# Patient Record
Sex: FEMALE | Race: WHITE | NOT HISPANIC OR LATINO | Employment: UNEMPLOYED | RURAL
[De-identification: names, ages, dates, MRNs, and addresses within clinical notes are randomized per-mention and may not be internally consistent; named-entity substitution may affect disease eponyms.]

---

## 2022-01-31 ENCOUNTER — TRANSCRIBE ORDERS (OUTPATIENT)
Dept: PHYSICAL THERAPY | Facility: CLINIC | Age: 67
End: 2022-01-31

## 2022-01-31 ENCOUNTER — TREATMENT (OUTPATIENT)
Dept: PHYSICAL THERAPY | Facility: CLINIC | Age: 67
End: 2022-01-31

## 2022-01-31 DIAGNOSIS — M48.061 LUMBAR FORAMINAL STENOSIS: Primary | ICD-10-CM

## 2022-01-31 DIAGNOSIS — M54.10 RADICULAR PAIN OF LEFT LOWER EXTREMITY: ICD-10-CM

## 2022-01-31 PROCEDURE — 97110 THERAPEUTIC EXERCISES: CPT | Performed by: PHYSICAL THERAPIST

## 2022-01-31 PROCEDURE — 97161 PT EVAL LOW COMPLEX 20 MIN: CPT | Performed by: PHYSICAL THERAPIST

## 2022-01-31 NOTE — PROGRESS NOTES
Physical Therapy Initial Evaluation and Plan of Care    Patient: Adelina Xavier   : 1955  Diagnosis/ICD-10 Code:  Lumbar foraminal stenosis [M48.061]  Referring practitioner: Andrea Allen MD  Date of Initial Visit: 2022  Today's Date: 2022  Patient seen for 1 sessions             Subjective Evaluation    History of Present Illness  Mechanism of injury: Patient is a 66 y.o. WF who presents with LBP with radiculopathy L LE for ~4 years after living in [facility] and driving on very bumpy roads for 2 years.  She has been in pain management for the past year and has tried 2 rounds of epidurals, branch block injection and radio frequency ablasion.  She has tried her friends inversion table.  Goals are to decrease pain.  She goes up/down stairs frequently throughout the day.  She goes back to Dr. Allen in February.     Patient Goals  Patient goals for therapy: decreased pain, increased strength and return to sport/leisure activities             Objective WOMAC functional score indicates 41% impairment with limitations in standing, walking, cooking, cleaning.  Lumbar AROM is WFL, pain on extension.  Deconditioned abdominals with protruding abdomen, otherwise in good physical condition.  MMT:  4/5 B hip flexors and L knee flexion, otherwise 4+-5/5.  Repeated sit to stand = 9 reps with arms on chair in 30 sec.  Pelvis level.  No remarkable tenderness to palpation.      Assessment & Plan     Assessment  Impairments: activity intolerance, impaired physical strength and lacks appropriate home exercise program  Functional Limitations: walking, uncomfortable because of pain and standing  Goals  Plan Goals: Patient independent with HEP in 2 weeks  Tolerate 10 min Nustep in 2 weeks  Decrease radicular symptoms 25% in 2 weeks  Improve function as evidenced by WOMAC score of 20% or less by discharge (41% impairment initially)  Able to tolerate standing to cook in 12 weeks  Decrease radicular symptoms 50%  in 12 weeks  Perform 10 reps of repeated sit to stand without arms in 30 seconds in 12 weeks       Plan  Therapy options: will be seen for skilled therapy services  Planned modality interventions: traction, thermotherapy (hydrocollator packs) and electrical stimulation/Russian stimulation  Other planned modality interventions: physical modalities as needed  Planned therapy interventions: balance/weight-bearing training, flexibility, functional ROM exercises, gait training, home exercise program, neuromuscular re-education, postural training, strengthening, stretching and therapeutic activities  Frequency: 2x week  Duration in weeks: 12  Treatment plan discussed with: patient        Timed:         Manual Therapy:         mins  98257;     Therapeutic Exercise:    15     mins  73470;     Neuromuscular Taiwo:        mins  25364;    Therapeutic Activity:          mins  17213;     Gait Training:           mins  14526;     Ultrasound:          mins  00067;    Self-care  ____ mins 20655    Un-Timed:  Electrical Stimulation:         mins  56242 ( );  Dry Needling          mins self-pay 23116  Traction          mins 89196  Low Eval     30     Mins  46581  Mod Eval          Mins  80663  Canal repositioning _____ mins  49955        Timed Treatment:   15   mins   Total Treatment:     45   mins    PT SIGNATURE: Robin A Sprigler, PT   [place] license # [id number]  Electronically signed by Robin A Sprigler, PT, 01/31/22, 11:55 AM EST    Initial Certification  Certification Period: 1/31/2022 through 4/30/2022  I certify that the therapy services are furnished while this patient is under my care.  The services outlined above are required by this patient, and will be reviewed every 90 days.     PHYSICIAN: Andrea Allen MD ______________________________________________________________________________________________     DATE:  _________________________________________________________________________________________________________________________________    Please sign and return via fax to [phone]. Thank you, [facility].

## 2022-02-10 ENCOUNTER — TREATMENT (OUTPATIENT)
Dept: PHYSICAL THERAPY | Facility: CLINIC | Age: 67
End: 2022-02-10

## 2022-02-10 DIAGNOSIS — M48.061 LUMBAR FORAMINAL STENOSIS: Primary | ICD-10-CM

## 2022-02-10 DIAGNOSIS — M54.10 RADICULAR PAIN OF LEFT LOWER EXTREMITY: ICD-10-CM

## 2022-02-10 PROCEDURE — 97112 NEUROMUSCULAR REEDUCATION: CPT | Performed by: PHYSICAL THERAPIST

## 2022-02-10 PROCEDURE — 97012 MECHANICAL TRACTION THERAPY: CPT | Performed by: PHYSICAL THERAPIST

## 2022-02-10 PROCEDURE — 97110 THERAPEUTIC EXERCISES: CPT | Performed by: PHYSICAL THERAPIST

## 2022-02-10 NOTE — PROGRESS NOTES
Physical Therapy Daily Progress Note    Patient: Adelina Xavier   : 1955  Diagnosis/ICD-10 Code:  Lumbar foraminal stenosis [M48.061]  Referring practitioner: Andrea Allen MD  Date of Initial Visit: Type: THERAPY  Noted: 2022  Today's Date: 2/10/2022  Patient seen for 2 sessions             Subjective Patient reports no decrease in leg symptoms since evaluation.  She is unsure when she goes back to Andrea Allen.    Objective   See Exercise, Manual, and Modality Logs for complete treatment. Added Nustep and lumbar traction with .  Assess response to treatment next visit.  VCs needed for repeated sit to stand (feet wide, sit on edge of chair, keep head and chest up)  and lumbar flexion (relax head and back).      Assessment/Plan  Patient independent with HEP in 2 weeks - NOT MET  Tolerate 10 min Nustep in 2 weeks - NOT MET  Decrease radicular symptoms 25% in 2 weeks - NOT MET  Improve function as evidenced by WOMAC score of 20% or less by discharge (41% impairment initially) - NOT MET  Able to tolerate standing to cook in 12 weeks - NOT MET  Decrease radicular symptoms 50% in 12 weeks - NOT MET  Perform 10 reps of repeated sit to stand without arms in 30 seconds in 12 weeks   - NOT MET    Progress per Plan of Care expiring 22           Timed:         Manual Therapy:         mins  85323;     Therapeutic Exercise:    15     mins  91010;     Neuromuscular Taiwo:  15      mins  24281;    Therapeutic Activity:          mins  80207;     Gait Training:           mins  24585;     Ultrasound:          mins  91802;    Ionto                                   mins   58865  Self Care                            mins   61473      Un-Timed:  Electrical Stimulation:         mins  26874 ( );  Dry Needling          mins self-pay  Traction     15     mins 25259  Low Eval          Mins  17020  Mod Eval          Mins  67222  High Eval                            Mins  52911  [facility]                    mins  55468    Timed Treatment:   30   mins   Total Treatment:     45   mins    Robin A Sprigler, PT  Physical Therapist

## 2022-02-24 ENCOUNTER — TELEPHONE (OUTPATIENT)
Dept: PHYSICAL THERAPY | Facility: CLINIC | Age: 67
End: 2022-02-24

## 2022-03-03 ENCOUNTER — TREATMENT (OUTPATIENT)
Dept: PHYSICAL THERAPY | Facility: CLINIC | Age: 67
End: 2022-03-03

## 2022-03-03 DIAGNOSIS — M48.061 LUMBAR FORAMINAL STENOSIS: Primary | ICD-10-CM

## 2022-03-03 DIAGNOSIS — M54.10 RADICULAR PAIN OF LEFT LOWER EXTREMITY: ICD-10-CM

## 2022-03-03 PROCEDURE — 97110 THERAPEUTIC EXERCISES: CPT | Performed by: PHYSICAL THERAPIST

## 2022-03-03 PROCEDURE — 97112 NEUROMUSCULAR REEDUCATION: CPT | Performed by: PHYSICAL THERAPIST

## 2022-03-03 PROCEDURE — 97012 MECHANICAL TRACTION THERAPY: CPT | Performed by: PHYSICAL THERAPIST

## 2022-03-03 NOTE — PROGRESS NOTES
Physical Therapy Daily Progress Note    Patient: Adelina Xavier   : 1955  Diagnosis/ICD-10 Code:  Lumbar foraminal stenosis [M48.061]  Referring practitioner: Andrea Allen MD  Date of Initial Visit: Type: THERAPY  Noted: 2022  Today's Date: 3/3/2022  Patient seen for 3 sessions             Subjective Patient reports she bought an inversion table for home.  Patient reports no pain after last treatment while walking in town.  Patient missed last week due to weather and the week before her  had other plans he hadn't told her about.      Objective   See Exercise, Manual, and Modality Logs for complete treatment.  Discussed use of inversion table.  Added standing hip abd and upper row/lat pull today with good response.  Held sit to stand due to knee pain today.      Assessment/Plan  Patient independent with HEP in 2 weeks - NOT MET  Tolerate 10 min Nustep in 2 weeks - NOT MET  Decrease radicular symptoms 25% in 2 weeks - NOT MET  Improve function as evidenced by WOMAC score of 20% or less by discharge (41% impairment initially) - NOT MET  Able to tolerate standing to cook in 12 weeks - NOT MET  Decrease radicular symptoms 50% in 12 weeks - NOT MET  Perform 10 reps of repeated sit to stand without arms in 30 seconds in 12 weeks   - NOT MET    Progress per Plan of Care expiring 22           Timed:         Manual Therapy:         mins  03347;     Therapeutic Exercise:    15     mins  16694;     Neuromuscular Taiwo:    15    mins  81444;    Therapeutic Activity:          mins  45227;     Gait Training:           mins  04608;     Ultrasound:          mins  92674;    Ionto                                   mins   27726  Self Care                            mins   97621      Un-Timed:  Electrical Stimulation:         mins  36014 ( );  Dry Needling          mins self-pay  Traction     15     mins 02693  Low Eval          Mins  73254  Mod Eval          Mins  33043  High Eval                             Mins  78052  [facility]                   mins  43805    Timed Treatment:   30   mins   Total Treatment:     45   mins    Robin A Sprigler, PT  Physical Therapist

## 2022-03-10 ENCOUNTER — TREATMENT (OUTPATIENT)
Dept: PHYSICAL THERAPY | Facility: CLINIC | Age: 67
End: 2022-03-10

## 2022-03-10 DIAGNOSIS — M54.10 RADICULAR PAIN OF LEFT LOWER EXTREMITY: ICD-10-CM

## 2022-03-10 DIAGNOSIS — M48.061 LUMBAR FORAMINAL STENOSIS: Primary | ICD-10-CM

## 2022-03-10 PROCEDURE — 97112 NEUROMUSCULAR REEDUCATION: CPT | Performed by: PHYSICAL THERAPIST

## 2022-03-10 PROCEDURE — 97012 MECHANICAL TRACTION THERAPY: CPT | Performed by: PHYSICAL THERAPIST

## 2022-03-10 PROCEDURE — 97110 THERAPEUTIC EXERCISES: CPT | Performed by: PHYSICAL THERAPIST

## 2022-03-10 NOTE — PROGRESS NOTES
Physical Therapy Daily Progress Note    Patient: Adelina Xavier   : 1955  Diagnosis/ICD-10 Code:  Lumbar foraminal stenosis [M48.061]  Referring practitioner: Andrea Allen MD  Date of Initial Visit: Type: THERAPY  Noted: 2022  Today's Date: 3/10/2022  Patient seen for 4 sessions             Subjective Patient is feeling better!  She is consistently using her inversion table 3 x's per day 5-8 min each.    Objective   See Exercise, Manual, and Modality Logs for complete treatment. Repeated 60# with traction today with good tolerance.  Symptoms improving.      Assessment/Plan  Patient independent with HEP in 2 weeks - MET  Tolerate 10 min Nustep in 2 weeks -  MET  Decrease radicular symptoms 25% in 2 weeks - MET  Improve function as evidenced by WOMAC score of 20% or less in 12 weeks (41% impairment initially) - NOT MET  Able to tolerate standing to cook in 12 weeks - NOT MET  Decrease radicular symptoms 50% in 12 weeks - NOT MET  Perform 10 reps of repeated sit to stand without arms in 30 seconds in 12 weeks   - NOT MET    Progress per Plan of Care expiring 22           Timed:         Manual Therapy:         mins  14269;     Therapeutic Exercise:    15     mins  86988;     Neuromuscular Taiwo:    15    mins  36465;    Therapeutic Activity:          mins  81318;     Gait Training:           mins  13808;     Ultrasound:          mins  60654;    Ionto                                   mins   64510  Self Care                            mins   57187      Un-Timed:  Electrical Stimulation:         mins  46356 ( );  Dry Needling          mins self-pay  Traction     15     mins 12467  Low Eval          Mins  17593  Mod Eval          Mins  38082  High Eval                            Mins  25861  Canalith Repos                   mins  44565    Timed Treatment:   30   mins   Total Treatment:     45   mins    Robin A Sprigler, PT  Physical Therapist

## 2022-03-17 ENCOUNTER — TREATMENT (OUTPATIENT)
Dept: PHYSICAL THERAPY | Facility: CLINIC | Age: 67
End: 2022-03-17

## 2022-03-17 DIAGNOSIS — M54.10 RADICULAR PAIN OF LEFT LOWER EXTREMITY: ICD-10-CM

## 2022-03-17 DIAGNOSIS — M48.061 LUMBAR FORAMINAL STENOSIS: Primary | ICD-10-CM

## 2022-03-17 PROCEDURE — 97110 THERAPEUTIC EXERCISES: CPT | Performed by: PHYSICAL THERAPIST

## 2022-03-17 PROCEDURE — 97112 NEUROMUSCULAR REEDUCATION: CPT | Performed by: PHYSICAL THERAPIST

## 2022-03-17 PROCEDURE — 97012 MECHANICAL TRACTION THERAPY: CPT | Performed by: PHYSICAL THERAPIST

## 2022-03-17 NOTE — PROGRESS NOTES
Physical Therapy Daily Progress Note    Patient: Adelina Xavier   : 1955  Diagnosis/ICD-10 Code:  Lumbar foraminal stenosis [M48.061]  Referring practitioner: Andrea Allen MD  Date of Initial Visit: Type: THERAPY  Noted: 2022  Today's Date: 3/17/2022  Patient seen for 5 sessions             Subjective Patient continues to do well, consistently performing HEP and using inversion table 8 min twice per day.  Radicular pain has decreased about 25% since starting PT.  She reports some back pain with lat pull at home, discussed modifications and will reassess next visit.  Still pain with cooking/standing, using a rolling stool occasionally for relief.    Objective   See Exercise, Manual, and Modality Logs for complete treatment. Reviewed and discussed HEP answering questions from patient.      Assessment/Plan  Patient independent with HEP in 2 weeks - MET  Tolerate 10 min Nustep in 2 weeks -  MET  Decrease radicular symptoms 25% in 2 weeks - MET  Improve function as evidenced by WOMAC score of 20% or less in 12 weeks (41% impairment initially) - NOT MET  Able to tolerate standing to cook in 12 weeks - NOT MET  Decrease radicular symptoms 50% in 12 weeks - NOT MET  Perform 10 reps of repeated sit to stand without arms in 30 seconds in 12 weeks   - MET    Progress per Plan of Care expiring 22           Timed:         Manual Therapy:         mins  49552;     Therapeutic Exercise:    15     mins  29646;     Neuromuscular Taiwo:    15    mins  09457;    Therapeutic Activity:          mins  71635;     Gait Training:           mins  96118;     Ultrasound:          mins  99480;    Ionto                                   mins   45273  Self Care                            mins   98031      Un-Timed:  Electrical Stimulation:         mins  31941 ( );  Dry Needling          mins self-pay  Traction     15     mins 39030  Low Eval          Mins  14612  Mod Eval          Mins  85782  High Eval                             Mins  35529  [facility]                   mins  24004    Timed Treatment:   30   mins   Total Treatment:     45   mins    Robin A Sprigler, PT  Physical Therapist

## 2022-03-23 ENCOUNTER — TREATMENT (OUTPATIENT)
Dept: PHYSICAL THERAPY | Facility: CLINIC | Age: 67
End: 2022-03-23

## 2022-03-23 DIAGNOSIS — M48.061 LUMBAR FORAMINAL STENOSIS: Primary | ICD-10-CM

## 2022-03-23 DIAGNOSIS — M54.10 RADICULAR PAIN OF LEFT LOWER EXTREMITY: ICD-10-CM

## 2022-03-23 PROCEDURE — 97112 NEUROMUSCULAR REEDUCATION: CPT | Performed by: PHYSICAL THERAPIST

## 2022-03-23 PROCEDURE — 97012 MECHANICAL TRACTION THERAPY: CPT | Performed by: PHYSICAL THERAPIST

## 2022-03-23 PROCEDURE — 97110 THERAPEUTIC EXERCISES: CPT | Performed by: PHYSICAL THERAPIST

## 2022-03-23 NOTE — PROGRESS NOTES
Physical Therapy Daily Progress Note    Patient: Adelina Xavier   : 1955  Diagnosis/ICD-10 Code:  Lumbar foraminal stenosis [M48.061]  Referring practitioner: Andrea Allen MD  Date of Initial Visit: Type: THERAPY  Noted: 2022  Today's Date: 3/23/2022  Patient seen for 6 sessions             Subjective Patient reports increased pain Monday after a busy weekend.  She reports her muscles were spasming and now it is very tender to touch on sacrum and spine.  She has continued to use her inversion table which helps but held off on exercises until today. She saw her spine doctor on Monday but nothing new.    Objective   See Exercise, Manual, and Modality Logs for complete treatment. Able to tolerate therex in clinic without difficulty      Assessment/Plan  Patient independent with HEP in 2 weeks - MET  Tolerate 10 min Nustep in 2 weeks -  MET  Decrease radicular symptoms 25% in 2 weeks - MET  Improve function as evidenced by WOMAC score of 20% or less in 12 weeks (41% impairment initially) - NOT MET  Able to tolerate standing to cook in 12 weeks - NOT MET  Decrease radicular symptoms 50% in 12 weeks - NOT MET  Perform 10 reps of repeated sit to stand without arms in 30 seconds in 12 weeks   - MET    Progress per Plan of Care expiring 22           Timed:         Manual Therapy:         mins  03052;     Therapeutic Exercise:    15     mins  11338;     Neuromuscular Taiwo:    15    mins  85441;    Therapeutic Activity:          mins  11902;     Gait Training:           mins  25342;     Ultrasound:          mins  24842;    Ionto                                   mins   90716  Self Care                            mins   77764      Un-Timed:  Electrical Stimulation:         mins  93090 ( );  Dry Needling          mins self-pay  Traction     15     mins 65748  Low Eval          Mins  86333  Mod Eval          Mins  66311  High Eval                            Mins  04787  [facility]                    mins  55477    Timed Treatment:   30   mins   Total Treatment:     45   mins    Robin A Sprigler, PT  Physical Therapist

## 2022-04-07 ENCOUNTER — TREATMENT (OUTPATIENT)
Dept: PHYSICAL THERAPY | Facility: CLINIC | Age: 67
End: 2022-04-07

## 2022-04-07 DIAGNOSIS — M54.10 RADICULAR PAIN OF LEFT LOWER EXTREMITY: ICD-10-CM

## 2022-04-07 DIAGNOSIS — M48.061 LUMBAR FORAMINAL STENOSIS: Primary | ICD-10-CM

## 2022-04-07 PROCEDURE — 97110 THERAPEUTIC EXERCISES: CPT | Performed by: PHYSICAL THERAPIST

## 2022-04-07 PROCEDURE — 97012 MECHANICAL TRACTION THERAPY: CPT | Performed by: PHYSICAL THERAPIST

## 2022-04-07 PROCEDURE — 97112 NEUROMUSCULAR REEDUCATION: CPT | Performed by: PHYSICAL THERAPIST

## 2022-04-07 NOTE — PROGRESS NOTES
Physical Therapy Daily Progress Note    Patient: Adelina Xavier   : 1955  Diagnosis/ICD-10 Code:  Lumbar foraminal stenosis [M48.061]  Referring practitioner: Andrea Allen MD  Date of Initial Visit: Type: THERAPY  Noted: 2022  Today's Date: 2022  Patient seen for 7 sessions             Subjective Patient reports increased radicular symptoms after trip to [place] and sitting in [facility].  Unable to use inversion table while she was gone.  Didn't know whether to do exercises when in that much pain.    Objective   See Exercise, Manual, and Modality Logs for complete treatment. Not seen since 3/23/22.  Radicular pain increased today to ankle.  Patient reports feeling better after treatment.      Assessment/Plan  Patient independent with HEP in 2 weeks - MET  Tolerate 10 min Nustep in 2 weeks -  MET  Decrease radicular symptoms 25% in 2 weeks - MET  Improve function as evidenced by WOMAC score of 20% or less in 12 weeks (41% impairment initially) - NOT MET  Able to tolerate standing to cook in 12 weeks - NOT MET  Decrease radicular symptoms 50% in 12 weeks - NOT MET  Perform 10 reps of repeated sit to stand without arms in 30 seconds in 12 weeks   - MET    Progress per Plan of Care expiring 22           Timed:         Manual Therapy:         mins  52153;     Therapeutic Exercise:    15     mins  58377;     Neuromuscular Taiwo:    15    mins  93132;    Therapeutic Activity:          mins  86421;     Gait Training:           mins  89560;     Ultrasound:          mins  25048;    Ionto                                   mins   07574  Self Care                            mins   68201      Un-Timed:  Electrical Stimulation:         mins  34338 ( );  Dry Needling          mins self-pay  Traction     15     mins 39029  Low Eval          Mins  24962  Mod Eval          Mins  02380  High Eval                            Mins  22568  [facility]                   mins  53806    Timed  Treatment:   30   mins   Total Treatment:     45   mins    Robin A Sprigler, PT  Physical Therapist

## 2022-04-12 ENCOUNTER — TELEPHONE (OUTPATIENT)
Dept: PHYSICAL THERAPY | Facility: CLINIC | Age: 67
End: 2022-04-12

## 2022-04-14 ENCOUNTER — TREATMENT (OUTPATIENT)
Dept: PHYSICAL THERAPY | Facility: CLINIC | Age: 67
End: 2022-04-14

## 2022-04-14 DIAGNOSIS — M54.10 RADICULAR PAIN OF LEFT LOWER EXTREMITY: ICD-10-CM

## 2022-04-14 DIAGNOSIS — M48.061 LUMBAR FORAMINAL STENOSIS: Primary | ICD-10-CM

## 2022-04-14 PROCEDURE — 97110 THERAPEUTIC EXERCISES: CPT | Performed by: PHYSICAL THERAPIST

## 2022-04-14 PROCEDURE — 97012 MECHANICAL TRACTION THERAPY: CPT | Performed by: PHYSICAL THERAPIST

## 2022-04-14 NOTE — PROGRESS NOTES
Physical Therapy Daily Progress Note      Patient: Adelina Xavier   : 1955  Diagnosis/ICD-10 Code:  Lumbar foraminal stenosis [M48.061]   Problems Addressed this Visit    None     Visit Diagnoses     Lumbar foraminal stenosis    -  Primary    Radicular pain of left lower extremity          Diagnoses       Codes Comments    Lumbar foraminal stenosis    -  Primary ICD-10-CM: M48.061  ICD-9-CM: 724.02     Radicular pain of left lower extremity     ICD-10-CM: M54.10  ICD-9-CM: 724.4         Referring practitioner: Andrea Allen MD  Date of Initial Visit: Type: THERAPY  Noted: 2022  Today's Date: 2022    VISIT#: 8    Subjective   Adelina report she's been keeping up with her HEP but sometimes has a difficult time remembering to draw abs in and keep scaps back & down.     Objective     See Exercise, Manual, and Modality Logs for complete treatment.     Assessment/Plan  inc'd resistance on NuStep today to level 7, pt denied any adverse effects. Attempted to increase lat pulls to 8kg per pt request but was too difficult, went back to 6kg.   Progress per Plan of Care         Timed:         Manual Therapy:         mins  67315;     Therapeutic Exercise:    16     mins  02891;     Neuromuscular Taiwo:        mins  25892;    Therapeutic Activity:          mins  10923;     Gait Training:           mins  06864;     Ultrasound:          mins  87098;    Ionto                                   mins   06841  Self Care                            mins   40907  [facility]                   mins  41003    Un-Timed:  Electrical Stimulation:         mins  44193 ( );  Dry Needling          mins 80660/60734  Traction     15     mins 12000  Low Eval          Mins  92947  Mod Eval          Mins  07569  High Eval                            Mins  61407  Re-Eval                               mins  70067    Timed Treatment: 16   mins   Total Treatment:     31   mins    Beba Streeter, PT, DPT, cert.  [facility]  Physical Therapist  [place] [facility] # [id number]

## 2022-04-21 ENCOUNTER — TELEPHONE (OUTPATIENT)
Dept: PHYSICAL THERAPY | Facility: CLINIC | Age: 67
End: 2022-04-21

## 2022-04-25 ENCOUNTER — TREATMENT (OUTPATIENT)
Dept: PHYSICAL THERAPY | Facility: CLINIC | Age: 67
End: 2022-04-25

## 2022-04-25 DIAGNOSIS — M54.10 RADICULAR PAIN OF LEFT LOWER EXTREMITY: ICD-10-CM

## 2022-04-25 DIAGNOSIS — M48.061 LUMBAR FORAMINAL STENOSIS: Primary | ICD-10-CM

## 2022-04-25 PROCEDURE — 97110 THERAPEUTIC EXERCISES: CPT | Performed by: PHYSICAL THERAPIST

## 2022-04-25 PROCEDURE — 97012 MECHANICAL TRACTION THERAPY: CPT | Performed by: PHYSICAL THERAPIST

## 2022-04-25 NOTE — PROGRESS NOTES
Re-Certification and Physical Therapy Daily Progress Note    Patient: Adelina Xavier   : 1955  Diagnosis/ICD-10 Code:  Lumbar foraminal stenosis [M48.061]  Referring practitioner: Andrea Allen MD  Date of Initial Visit: Type: THERAPY  Noted: 2022  Today's Date: 2022  Patient seen for 9 sessions             Subjective Patient reports she's recovering from a respiratory infection and having decreased lung capacity.  Currently low back is pain free because she hasn't been as active due to decreased energy and endurance.  She has not been able to tolerate her inversion table since being congested.    Objective   See Exercise, Manual, and Modality Logs for complete treatment. Patient has a long drive to come to PT so has only had 9 sessions in 90 days due to infrequent visits.  Completing re-cert today to request extension of plan of care.  WOMAC score indicates 34% impairment, improved from 41% initially with limitations remaining in stair climbing, cooking and heavy household chores.  Discussed recertification, patient would like to recert at this time and continue PT as needed.      Assessment/Plan  Patient independent with HEP in 2 weeks - MET  Tolerate 10 min Nustep in 2 weeks -  MET  Decrease radicular symptoms 25% in 2 weeks - MET  Improve function as evidenced by WOMAC score of 20% or less in 12 weeks (41% impairment initially) - PARTIALLY MET, currently 34%  Able to tolerate standing to cook in 12 weeks - PARTIALLY MET  Decrease radicular symptoms 50% in 12 weeks - MET  Perform 10 reps of repeated sit to stand without arms in 30 seconds in 12 weeks   - MET    Progress per Plan of Care expiring today, new recert will carry thru 22.           Timed:         Manual Therapy:         mins  89903;     Therapeutic Exercise:    15     mins  37323;     Neuromuscular Taiwo:        mins  53776;    Therapeutic Activity:          mins  81123;     Gait Training:           mins  95043;      Ultrasound:          mins  20169;    Ionto                                   mins   70347  Self Care                            mins   39807      Un-Timed:  Electrical Stimulation:         mins  97868 ( );  Dry Needling          mins self-pay  Traction     15     mins 50474  Low Eval          Mins  33475  Mod Eval          Mins  28366  High Eval                            Mins  42767  [facility]                   mins  90523    Timed Treatment:   15   mins   Total Treatment:     30   mins    Robin A Sprigler, PT  Physical Therapist  [place] license # [id number]  Electronically signed by Robin A Sprigler, PT, 01/31/22, 11:55 AM EST     Re-Certification                Certification Period: 4/30/2022 through 7/18/22/2022  I certify that the therapy services are furnished while this patient is under my care.  The services outlined above are required by this patient, and will be reviewed every 90 days.                PHYSICIAN: Andrea Allen MD  ______________________________________________________________________________________________                                            DATE: _________________________________________________________________________________________________________________________________     Please sign and return via fax to [phone]. Thank you, [facility] Physical Therapy.

## 2022-05-05 ENCOUNTER — TREATMENT (OUTPATIENT)
Dept: PHYSICAL THERAPY | Facility: CLINIC | Age: 67
End: 2022-05-05

## 2022-05-05 DIAGNOSIS — M48.061 LUMBAR FORAMINAL STENOSIS: Primary | ICD-10-CM

## 2022-05-05 DIAGNOSIS — M54.10 RADICULAR PAIN OF LEFT LOWER EXTREMITY: ICD-10-CM

## 2022-05-05 PROCEDURE — 97012 MECHANICAL TRACTION THERAPY: CPT | Performed by: PHYSICAL THERAPIST

## 2022-05-05 PROCEDURE — 97110 THERAPEUTIC EXERCISES: CPT | Performed by: PHYSICAL THERAPIST

## 2022-05-05 NOTE — PROGRESS NOTES
Physical Therapy Daily Progress Note    Patient: Adelina Xavier   : 1955  Diagnosis/ICD-10 Code:  Lumbar foraminal stenosis [M48.061]  Referring practitioner: Andrea Allen MD  Date of Initial Visit: Type: THERAPY  Noted: 2022  Today's Date: 2022  Patient seen for 10 sessions             Subjective Patient reports she is feeling better but her back is hurting with increased activity.    Objective   See Exercise, Manual, and Modality Logs for complete treatment.  goals met.      Assessment/Plan  Patient independent with HEP in 2 weeks - MET  Tolerate 10 min Nustep in 2 weeks -  MET  Decrease radicular symptoms 25% in 2 weeks - MET  Improve function as evidenced by WOMAC score of 20% or less in 12 weeks (41% impairment initially) - PARTIALLY MET, currently 34%  Able to tolerate standing to cook in 12 weeks - PARTIALLY MET  Decrease radicular symptoms 50% in 12 weeks - MET  Perform 10 reps of repeated sit to stand without arms in 30 seconds in 12 weeks   - MET     Progress per Plan of Care expiring 22           Timed:         Manual Therapy:         mins  94890;     Therapeutic Exercise:    15     mins  37253;     Neuromuscular Taiwo:        mins  54634;    Therapeutic Activity:          mins  64161;     Gait Training:           mins  41611;     Ultrasound:          mins  67261;    Ionto                                   mins   92934  Self Care                            mins   40112      Un-Timed:  Electrical Stimulation:         mins  69818 ( );  Dry Needling          mins self-pay  Traction     15     mins 96179  Low Eval          Mins  65139  Mod Eval          Mins  29896  High Eval                            Mins  17717  [facility]                   mins  26373    Timed Treatment:   15   mins   Total Treatment:     30   mins    Robin A Sprigler, PT  Physical Therapist

## 2022-05-12 ENCOUNTER — TREATMENT (OUTPATIENT)
Dept: PHYSICAL THERAPY | Facility: CLINIC | Age: 67
End: 2022-05-12

## 2022-05-12 DIAGNOSIS — M48.061 LUMBAR FORAMINAL STENOSIS: Primary | ICD-10-CM

## 2022-05-12 DIAGNOSIS — M54.10 RADICULAR PAIN OF LEFT LOWER EXTREMITY: ICD-10-CM

## 2022-05-12 PROCEDURE — 97110 THERAPEUTIC EXERCISES: CPT | Performed by: PHYSICAL THERAPIST

## 2022-05-12 PROCEDURE — 97530 THERAPEUTIC ACTIVITIES: CPT | Performed by: PHYSICAL THERAPIST

## 2022-05-12 PROCEDURE — 97012 MECHANICAL TRACTION THERAPY: CPT | Performed by: PHYSICAL THERAPIST

## 2022-05-12 NOTE — PROGRESS NOTES
Physical Therapy Daily Progress Note    Patient: Adelina Xavier   : 1955  Diagnosis/ICD-10 Code:  Lumbar foraminal stenosis [M48.061]  Referring practitioner: Andrea Allen MD  Date of Initial Visit: Type: THERAPY  Noted: 2022  Today's Date: 2022  Patient seen for 11 sessions             Subjective Patient reports increased pain yesterday with pressure changes.  She reports her balance isn't very good and she'd like to try some exercises to improve it.  Patient requested to increase traction pull today.      Objective   See Exercise, Manual, and Modality Logs for complete treatment. Added SLS, EO/EC on foam, VOR.  Increased traction pull to 65#.  SLS = 6 sec R, 8 sec L.  EC NBOS on floor = 30 sec.        Assessment/Plan  Patient independent with HEP in 2 weeks - MET  Tolerate 10 min Nustep in 2 weeks -  MET  Decrease radicular symptoms 25% in 2 weeks - MET  Improve function as evidenced by WOMAC score of 20% or less in 12 weeks (41% impairment initially) - PARTIALLY MET, currently 34%  Able to tolerate standing to cook in 12 weeks - PARTIALLY MET  Decrease radicular symptoms 50% in 12 weeks - MET  Perform 10 reps of repeated sit to stand without arms in 30 seconds in 12 weeks   - MET    Progress per Plan of Care expiring 22           Timed:         Manual Therapy:         mins  25904;     Therapeutic Exercise:    15     mins  27332;     Neuromuscular Taiwo:        mins  22465;    Therapeutic Activity:     15     mins  25587;     Gait Training:           mins  15914;     Ultrasound:          mins  04783;    Ionto                                   mins   20224  Self Care                            mins   45250      Un-Timed:  Electrical Stimulation:         mins  63107 ( );  Dry Needling          mins self-pay  Traction     15     mins 41456  Low Eval          Mins  15116  Mod Eval          Mins  78479  High Eval                            Mins  01104  [facility] Repos                    mins  60476    Timed Treatment:   30   mins   Total Treatment:     45   mins    Robin A Sprigler, PT  Physical Therapist

## 2022-05-19 ENCOUNTER — TREATMENT (OUTPATIENT)
Dept: PHYSICAL THERAPY | Facility: CLINIC | Age: 67
End: 2022-05-19

## 2022-05-19 DIAGNOSIS — M48.061 LUMBAR FORAMINAL STENOSIS: Primary | ICD-10-CM

## 2022-05-19 DIAGNOSIS — M54.10 RADICULAR PAIN OF LEFT LOWER EXTREMITY: ICD-10-CM

## 2022-05-19 PROCEDURE — 97530 THERAPEUTIC ACTIVITIES: CPT | Performed by: PHYSICAL THERAPIST

## 2022-05-19 PROCEDURE — 97110 THERAPEUTIC EXERCISES: CPT | Performed by: PHYSICAL THERAPIST

## 2022-05-19 PROCEDURE — 97012 MECHANICAL TRACTION THERAPY: CPT | Performed by: PHYSICAL THERAPIST

## 2022-05-19 NOTE — PROGRESS NOTES
Physical Therapy Daily Progress Note    Patient: Adelina Xavier   : 1955  Diagnosis/ICD-10 Code:  Lumbar foraminal stenosis [M48.061]  Referring practitioner: Andrea Allen MD  Date of Initial Visit: Type: THERAPY  Noted: 2022  Today's Date: 2022  Patient seen for 12 sessions             Subjective Patient reports she is excited about treatment today because she felt so much better for so long after last visit.    Objective   See Exercise, Manual, and Modality Logs for complete treatment.       Assessment/Plan  Patient independent with HEP in 2 weeks - MET  Tolerate 10 min Nustep in 2 weeks -  MET  Decrease radicular symptoms 25% in 2 weeks - MET  Improve function as evidenced by WOMAC score of 20% or less in 12 weeks (41% impairment initially) - PARTIALLY MET, currently 34%  Able to tolerate standing to cook in 12 weeks - PARTIALLY MET  Decrease radicular symptoms 50% in 12 weeks - MET  Perform 10 reps of repeated sit to stand without arms in 30 seconds in 12 weeks   - MET    Progress per Plan of Care expiring 22           Timed:         Manual Therapy:         mins  94601;     Therapeutic Exercise:    15     mins  53989;     Neuromuscular Taiwo:        mins  38034;    Therapeutic Activity:     15     mins  00438;     Gait Training:           mins  65583;     Ultrasound:          mins  54645;    Ionto                                   mins   79656  Self Care                            mins   84229      Un-Timed:  Electrical Stimulation:         mins  91874 ( );  Dry Needling          mins self-pay  Traction     15     mins 11567  Low Eval          Mins  56348  Mod Eval          Mins  41436  High Eval                            Mins  23988  Canalith Repos                   mins  25301    Timed Treatment:   30   mins   Total Treatment:     45   mins    Robin A Sprigler, PT  Physical Therapist

## 2022-05-26 ENCOUNTER — TREATMENT (OUTPATIENT)
Dept: PHYSICAL THERAPY | Facility: CLINIC | Age: 67
End: 2022-05-26

## 2022-05-26 DIAGNOSIS — M48.061 LUMBAR FORAMINAL STENOSIS: Primary | ICD-10-CM

## 2022-05-26 DIAGNOSIS — M54.10 RADICULAR PAIN OF LEFT LOWER EXTREMITY: ICD-10-CM

## 2022-05-26 PROCEDURE — 97110 THERAPEUTIC EXERCISES: CPT | Performed by: PHYSICAL THERAPIST

## 2022-05-26 PROCEDURE — 97530 THERAPEUTIC ACTIVITIES: CPT | Performed by: PHYSICAL THERAPIST

## 2022-05-26 PROCEDURE — 97012 MECHANICAL TRACTION THERAPY: CPT | Performed by: PHYSICAL THERAPIST

## 2022-05-26 NOTE — PROGRESS NOTES
Physical Therapy Daily Progress Note    Patient: Adelina Xavier   : 1955  Diagnosis/ICD-10 Code:  Lumbar foraminal stenosis [M48.061]  Referring practitioner: Andrea Allen MD  Date of Initial Visit: Type: THERAPY  Noted: 2022  Today's Date: 2022  Patient seen for 13 sessions             Subjective Patient reports she is feeling better.    Objective   See Exercise, Manual, and Modality Logs for complete treatment. Increased traction pull to 70# per patient request.      Assessment/Plan  Patient independent with HEP in 2 weeks - MET  Tolerate 10 min Nustep in 2 weeks -  MET  Decrease radicular symptoms 25% in 2 weeks - MET  Improve function as evidenced by WOMAC score of 20% or less in 12 weeks (41% impairment initially) - PARTIALLY MET, currently 34%  Able to tolerate standing to cook in 12 weeks - PARTIALLY MET  Decrease radicular symptoms 50% in 12 weeks - MET  Perform 10 reps of repeated sit to stand without arms in 30 seconds in 12 weeks   - MET    Progress per Plan of Care expiring 22           Timed:         Manual Therapy:         mins  22780;     Therapeutic Exercise:    15     mins  66720;     Neuromuscular Taiwo:        mins  53251;    Therapeutic Activity:     10     mins  70714;     Gait Training:           mins  46196;     Ultrasound:          mins  40248;    Ionto                                   mins   77530  Self Care                            mins   92499      Un-Timed:  Electrical Stimulation:         mins  89213 ( );  Dry Needling          mins self-pay  Traction     15     mins 27076  Low Eval          Mins  99218  Mod Eval          Mins  21823  High Eval                            Mins  05414  Canalith Repos                   mins  58455    Timed Treatment:   25   mins   Total Treatment:     40   mins    Robin A Sprigler, PT  Physical Therapist

## 2022-09-29 ENCOUNTER — DOCUMENTATION (OUTPATIENT)
Dept: PHYSICAL THERAPY | Facility: CLINIC | Age: 67
End: 2022-09-29

## 2022-09-29 DIAGNOSIS — M48.061 LUMBAR FORAMINAL STENOSIS: Primary | ICD-10-CM

## 2022-09-29 DIAGNOSIS — M54.10 RADICULAR PAIN OF LEFT LOWER EXTREMITY: ICD-10-CM

## 2022-09-29 NOTE — PROGRESS NOTES
Discharge Summary  Discharge Summary from Physical Therapy Report      Goals: Partially Met    Discharge Plan: Continue with current home exercise program as instructed    Comments Patient failed to return for treatment.  Last seen 5/26/2022  Patient seen for 13 sessions                  Subjective Patient reports she is feeling better.     Objective   See Exercise, Manual, and Modality Logs for complete treatment. Increased traction pull to 70# per patient request.        Assessment/Plan  Patient independent with HEP in 2 weeks - MET  Tolerate 10 min Nustep in 2 weeks -  MET  Decrease radicular symptoms 25% in 2 weeks - MET  Improve function as evidenced by WOMAC score of 20% or less in 12 weeks (41% impairment initially) - PARTIALLY MET, currently 34%  Able to tolerate standing to cook in 12 weeks - PARTIALLY MET  Decrease radicular symptoms 50% in 12 weeks - MET  Perform 10 reps of repeated sit to stand without arms in 30 seconds in 12 weeks   - MET      Date of Discharge 9/29/22        Robin A Sprigler, PT  Physical Therapist

## 2024-07-09 NOTE — PROGRESS NOTES
"Subjective   History of Present Illness: Adelina Xavier is a 69 y.o. female is being seen for consultation today at the request of FRANCISCO Hollis for back pain and headaches.  Her back and leg symptoms composed the majority of her symptoms. Approximately 3 weeks ago patient \"out of the blue \"began to experience left-sided posterior hip along the SI joint, buttock pain and leg pain.  This is somewhat different than her previous pain she had.  She also complains of left groin pain.  Her pain is worse standing.  Bending forward does help and sitting does help as well.  Patient has past surgical history of L4-L5 posterior fusion.  She underwent injection therapy RFAs as well afterward for continued back and left leg pain for a year and a half which resolved her pain until approximately 3 weeks ago.   Patient also describes chronic history of headaches that began at the base of her skull and radiate forward.  She also feels like her head is numb and she gets some facial tingling on the right side.  Her facial tingling has been present since she had maxillary sinus surgery and significant dental work completed.  She is reporting no neck pain, arm pain, arm weakness, numbness and tingling in her arms or hands bowel/bladder dysfunction, saddle anesthesia, leg weakness, balance issues or dizziness.    History of Present Illness    The following portions of the patient's history were reviewed and updated as appropriate: allergies, current medications, past family history, past medical history, past social history, past surgical history, and problem list.    Review of Systems   Constitutional:  Positive for activity change.   HENT: Negative.     Eyes: Negative.    Respiratory: Negative.     Cardiovascular: Negative.    Gastrointestinal: Negative.    Endocrine: Negative.    Genitourinary: Negative.    Musculoskeletal:  Positive for arthralgias, back pain and myalgias.   Skin: Negative.    Allergic/Immunologic: " "Negative.    Neurological:  Positive for numbness (+tingling sometimes in left leg).        Sharp stabbing in back   Dull ache in pelvis   Left leg pain goes into that    Sitting makes it better  Standing is the worst    Hematological: Negative.    Psychiatric/Behavioral: Negative.     All other systems reviewed and are negative.      Objective     /77   Pulse 50   Resp 18   Ht 165.1 cm (65\")   Wt 61.7 kg (136 lb)   LMP  (LMP Unknown)   SpO2 98%   BMI 22.63 kg/m²    Body mass index is 22.63 kg/m².    Vitals:    07/11/24 1130   PainSc:   3   PainLoc: Back          Physical Exam  Neurologic Exam  Negative Jolynn  2+ patellar reflexes  Lower extremity motor strength 5/5  Strong positive Sammie finger  Positive Gaenslen's  Positive Pako's maneuver  Positive SI compression    Assessment & Plan   Independent Review of Radiographic Studies:      I personally reviewed the images from the following studies.    Lumbar x-rays    Postsurgical changes noted with prior L4-L5 fusion.  Multilevel lumbar spondylitic changes mainly at L3-L4 and L5-S1.      Medical Decision Making:      Adelina Santoyo a 69 y.o. female with prior history of L4-L5 posterior fusion in 2015 that is presenting to clinic today for chronic history of cervicogenic headaches.  Patient has no upper motor neuron findings, neck pain, arm pain and feel that she may benefit from some targeted injection therapy.  No dedicated imaging to review.  In regards to her left back and leg symptoms, her clinical presentation more concordant with sacroiliitis but given her prior surgery and some adjacent segment disease changes above and below the fusion site not ruling out a lumbar radiculopathy component.  She had recent MRI completed but it is unable to be viewed as no disc was brought in and images have not been pushed over.  They will be reviewed if/when images are pushed over.  Patient will be sent to pain management for targeted injection " therapy if she had significant betterment of her symptoms with this in the past.  He is welcome to follow-up if her injections become refractory or are not significantly therapeutic for further recommendations.  We will work to get images pushed over.  The normal course of acute radicular pain is resolution within 8 to 12 weeks and that a conservative approach to management is usually recommended.  These conservative approaches include maximizing pharmacologic therapy and a formal course of physical therapy  to help reduce inflammation.    Lifestyle modifications such as decreased activity and avoidance of stress on the spine from bearing weight or twisting will also give the injured area an opportunity to heal and prevent further injury.    Patient verbalizes understanding of plan of care and agrees to recommendations.            Diagnoses and all orders for this visit:    1. DDD (degenerative disc disease), lumbar (Primary)  -     Ambulatory Referral to Pain Management    2. Sacroiliitis  -     Ambulatory Referral to Pain Management    3. Cervicogenic headache  -     butalbital-acetaminophen-caffeine (Esgic) -40 MG per tablet; Take 1 tablet by mouth Every 8 (Eight) Hours As Needed for Headache.  Dispense: 24 tablet; Refill: 0    Other orders  -     methylPREDNISolone (MEDROL) 4 MG dose pack; Take as directed on package instructions.  Dispense: 1 each; Refill: 0      Return for Recheck.    This patient was examined wearing appropriate personal protective equipment.     Adelina Xavier  reports that she has never smoked. She has never been exposed to tobacco smoke. She has never used smokeless tobacco.      Body mass index is 22.63 kg/m².  BMI is within normal parameters. No other follow-up for BMI required.      Patient's blood pressure was reviewed.  Recommendations for  a low-salt diet and exercise to maintain/improve BP in addition to taking any presribed medications.    Advance Care Planning   ACP  discussion was held with the patient during this visit. Patient has an advance directive (not in EMR), copy requested.             Sonya Omalley DNP, APRN    07/11/24  13:06 EDT

## 2024-07-11 ENCOUNTER — OFFICE VISIT (OUTPATIENT)
Dept: NEUROSURGERY | Facility: CLINIC | Age: 69
End: 2024-07-11
Payer: MEDICARE

## 2024-07-11 VITALS
WEIGHT: 136 LBS | HEART RATE: 50 BPM | OXYGEN SATURATION: 98 % | BODY MASS INDEX: 22.66 KG/M2 | HEIGHT: 65 IN | DIASTOLIC BLOOD PRESSURE: 77 MMHG | RESPIRATION RATE: 18 BRPM | SYSTOLIC BLOOD PRESSURE: 159 MMHG

## 2024-07-11 DIAGNOSIS — M46.1 SACROILIITIS: ICD-10-CM

## 2024-07-11 DIAGNOSIS — G44.86 CERVICOGENIC HEADACHE: ICD-10-CM

## 2024-07-11 DIAGNOSIS — M51.36 DDD (DEGENERATIVE DISC DISEASE), LUMBAR: Primary | ICD-10-CM

## 2024-07-11 PROBLEM — M51.369 DDD (DEGENERATIVE DISC DISEASE), LUMBAR: Status: ACTIVE | Noted: 2024-07-11

## 2024-07-11 PROBLEM — I10 HYPERTENSION: Status: ACTIVE | Noted: 2024-07-11

## 2024-07-11 PROCEDURE — 99204 OFFICE O/P NEW MOD 45 MIN: CPT | Performed by: NURSE PRACTITIONER

## 2024-07-11 PROCEDURE — 1160F RVW MEDS BY RX/DR IN RCRD: CPT | Performed by: NURSE PRACTITIONER

## 2024-07-11 PROCEDURE — 3077F SYST BP >= 140 MM HG: CPT | Performed by: NURSE PRACTITIONER

## 2024-07-11 PROCEDURE — 3078F DIAST BP <80 MM HG: CPT | Performed by: NURSE PRACTITIONER

## 2024-07-11 PROCEDURE — 1159F MED LIST DOCD IN RCRD: CPT | Performed by: NURSE PRACTITIONER

## 2024-07-11 RX ORDER — METHOCARBAMOL 500 MG/1
TABLET, FILM COATED ORAL
COMMUNITY

## 2024-07-11 RX ORDER — ESZOPICLONE 2 MG/1
TABLET, FILM COATED ORAL
COMMUNITY
Start: 2024-07-10

## 2024-07-11 RX ORDER — LEVOTHYROXINE SODIUM 0.05 MG/1
1 TABLET ORAL DAILY
COMMUNITY
Start: 2024-05-04

## 2024-07-11 RX ORDER — METHYLPREDNISOLONE 4 MG/1
TABLET ORAL
Qty: 1 EACH | Refills: 0 | Status: SHIPPED | OUTPATIENT
Start: 2024-07-11

## 2024-07-11 RX ORDER — MAGNESIUM GLUCONATE 30 MG(550)
TABLET ORAL
COMMUNITY
Start: 2014-09-25

## 2024-07-11 RX ORDER — BUTALBITAL, ACETAMINOPHEN AND CAFFEINE 50; 325; 40 MG/1; MG/1; MG/1
1 TABLET ORAL EVERY 8 HOURS PRN
Qty: 24 TABLET | Refills: 0 | Status: SHIPPED | OUTPATIENT
Start: 2024-07-11

## 2024-07-11 RX ORDER — METOPROLOL SUCCINATE 25 MG/1
1 TABLET, EXTENDED RELEASE ORAL DAILY
COMMUNITY
Start: 2024-07-03

## 2024-07-11 RX ORDER — GABAPENTIN 100 MG/1
100 CAPSULE ORAL 3 TIMES DAILY
COMMUNITY

## 2024-07-22 ENCOUNTER — OFFICE VISIT (OUTPATIENT)
Age: 69
End: 2024-07-22
Payer: MEDICARE

## 2024-07-22 VITALS
HEART RATE: 57 BPM | WEIGHT: 133 LBS | DIASTOLIC BLOOD PRESSURE: 56 MMHG | OXYGEN SATURATION: 100 % | HEIGHT: 65 IN | BODY MASS INDEX: 22.16 KG/M2 | SYSTOLIC BLOOD PRESSURE: 170 MMHG

## 2024-07-22 DIAGNOSIS — I65.23 CAROTID STENOSIS, BILATERAL: Primary | ICD-10-CM

## 2024-07-22 PROCEDURE — 99204 OFFICE O/P NEW MOD 45 MIN: CPT | Performed by: SURGERY

## 2024-07-22 PROCEDURE — 1159F MED LIST DOCD IN RCRD: CPT | Performed by: SURGERY

## 2024-07-22 PROCEDURE — 1160F RVW MEDS BY RX/DR IN RCRD: CPT | Performed by: SURGERY

## 2024-07-22 PROCEDURE — 3077F SYST BP >= 140 MM HG: CPT | Performed by: SURGERY

## 2024-07-22 PROCEDURE — 3078F DIAST BP <80 MM HG: CPT | Performed by: SURGERY

## 2024-07-22 RX ORDER — ATORVASTATIN CALCIUM 20 MG/1
20 TABLET, FILM COATED ORAL DAILY
Qty: 30 TABLET | Refills: 11 | Status: SHIPPED | OUTPATIENT
Start: 2024-07-22

## 2024-07-22 RX ORDER — ONDANSETRON 4 MG/1
4 TABLET, FILM COATED ORAL EVERY 12 HOURS PRN
COMMUNITY
Start: 2024-07-17

## 2024-07-22 RX ORDER — ASPIRIN 81 MG/1
81 TABLET ORAL DAILY
Qty: 30 TABLET | Refills: 11 | Status: SHIPPED | OUTPATIENT
Start: 2024-07-22

## 2024-07-22 RX ORDER — FLUTICASONE PROPIONATE 50 MCG
2 SPRAY, SUSPENSION (ML) NASAL DAILY
COMMUNITY
Start: 2024-07-15

## 2024-07-22 NOTE — PROGRESS NOTES
"Chief Complaint  New referral for Left Carotid Stenosis    Subjective        Adelina Xavier presents to [facility] VASCULAR SURGERY  History of Present Illness  New patient evaluation for incidentally identified carotid artery stenosis.  Denies any strokes mini strokes or amaurosis fugax  Objective   Vital Signs:  /56 (BP Location: Left arm, Patient Position: Sitting)   Pulse 57   Ht 165.1 cm (65\")   Wt 60.3 kg (133 lb)   SpO2 100%   BMI 22.13 kg/m²   Estimated body mass index is 22.13 kg/m² as calculated from the following:    Height as of this encounter: 165.1 cm (65\").    Weight as of this encounter: 60.3 kg (133 lb).     BMI is within normal parameters. No other follow-up for BMI required.    Adelina Xavier  reports that she has never smoked. She has never been exposed to tobacco smoke. She has never used smokeless tobacco.      Physical Exam  Constitutional:       Appearance: She is well-developed.   Pulmonary:      Effort: Pulmonary effort is normal. No respiratory distress.   Abdominal:      General: There is no distension.      Palpations: Abdomen is soft.   Neurological:      Mental Status: She is alert and oriented to person, place, and time.        Result Review :    The following data was reviewed by: Pito Parsons MD on 07/22/24          Data reviewed : Cardiology studies as below.  Vascular Surgical History:    Vascular Imaging History:  CT angiogram of the head and neck 7/6/2024 at [facility].  Approximately 50% stenosis of the left carotid artery.         Assessment and Plan     Vascular surgery following for:  Carotid stenosis    Diagnoses and all orders for this visit:    1. Carotid stenosis, bilateral (Primary)  -     Duplex Carotid Ultrasound CAR; Future    Other orders  -     aspirin 81 MG EC tablet; Take 1 tablet by mouth Daily.  Dispense: 30 tablet; Refill: 11  -     atorvastatin (Lipitor) 20 MG tablet; Take 1 tablet by mouth Daily.  Dispense: 30 " tablet; Refill: 11    Patient likely with moderate left carotid artery stenosis that is asymptomatic.  She denies any strokes mini strokes or amaurosis fugax.  This was seen incidentally on a CT scan of the sinuses.  Recommended carotid duplex in the next couple months to confirm degree of stenosis.  Assuming is less than 70% likely will perform serial ultrasounds at least yearly.  I recommended she start taking aspirin and statin therapy as below.  Questions answered     Pathophysiology of carotid artery disease discussed.  Patient understands for nonflow-limiting disease of the carotid in an asymptomatic patient noninterventional therapy is recommended.  Medical optimization is best practice.  We discussed lifestyle modifications as needed.  Patient understands that carotid blockages require periodic surveillance and if blockage progress and/or patient becomes symptomatic this would require reevaluation.  While medical therapy has some risk the lowest risk path forward is noninterventional/medical therapy.       Vascular medical management: Patient was prescribed aspirin 81 mg daily and Lipitor 20 mg daily.  These were sent to her pharmacy in [facility] in [facility].  Follow Up     Return in about 1 month (around 8/22/2024) for Follow up with vascular ultrasound.  Patient was given instructions and counseling regarding her condition or for health maintenance advice. Please see specific information pulled into the AVS if appropriate.

## 2024-07-26 ENCOUNTER — TELEPHONE (OUTPATIENT)
Dept: NEUROSURGERY | Facility: CLINIC | Age: 69
End: 2024-07-26
Payer: MEDICARE

## 2024-07-26 NOTE — TELEPHONE ENCOUNTER
Patient called asking if we had her CD that elvin LAMB was supposed to mail it to us on the day of her last visit. I stated we do not have the cd you will need to pick it up and bring it to the office and we can pout it in the system and give it back. She stated she understood

## 2024-07-29 ENCOUNTER — ANCILLARY ORDERS (OUTPATIENT)
Dept: OTHER | Facility: HOSPITAL | Age: 69
End: 2024-07-29
Payer: MEDICARE

## 2024-07-29 NOTE — TELEPHONE ENCOUNTER
Outside images were loaded to our system.    Patient brought the images into the office and waited while they were loaded into our system.    Date of images:7/24/2024 & 6/25/2024  Images loaded: MRI C & L    Imaging disc given back to the patient by Amy.

## 2024-07-29 NOTE — TELEPHONE ENCOUNTER
Patient called and stated that she will be bringing here CD from her MRI Lumbar done at [facility] in [place]. I did tell her that we can download the imaging to [facility] while she waits. She Understood.

## 2024-07-30 ENCOUNTER — TELEPHONE (OUTPATIENT)
Dept: NEUROSURGERY | Facility: CLINIC | Age: 69
End: 2024-07-30
Payer: MEDICARE

## 2024-07-30 NOTE — TELEPHONE ENCOUNTER
Called and spoke to patient about what Sonya had stated.   I have reviewed patient's imaging. Nothing felt to be surgical in her cervical spine. In her lumbar spine she should undergo evaluation with targeted injection with pain management and if symptoms persist should follow-up with Dr. Rae.     Patient stated she had done some reading and dont understand why no one can go in and clean up the places in her back that is having some break down. As she has already gotten injections before and done pt as it did not help.

## 2024-07-31 ENCOUNTER — CLINICAL SUPPORT (OUTPATIENT)
Dept: NEUROSURGERY | Facility: CLINIC | Age: 69
End: 2024-07-31
Payer: MEDICARE

## 2024-07-31 DIAGNOSIS — M51.36 DDD (DEGENERATIVE DISC DISEASE), LUMBAR: Primary | ICD-10-CM

## 2024-07-31 DIAGNOSIS — M46.1 SACROILIITIS: ICD-10-CM

## 2024-07-31 DIAGNOSIS — G44.86 CERVICOGENIC HEADACHE: ICD-10-CM

## 2024-07-31 DIAGNOSIS — M50.30 DDD (DEGENERATIVE DISC DISEASE), CERVICAL: ICD-10-CM

## 2024-07-31 PROCEDURE — 99214 OFFICE O/P EST MOD 30 MIN: CPT | Performed by: NURSE PRACTITIONER

## 2024-07-31 NOTE — PROGRESS NOTES
You have chosen to receive care through a telephone visit. Do you consent to use a telephone visit for your medical care today? Yes    I was in my office and patient was at home.  Total time of chart prep, imaging review, telephone conversation in chart completion was 78 minutes.    Subjective   History of Present Illness: Adelina Xavier is a 69 y.o. female is here today for follow-up.  Patient was initially seen and evaluated myself on 7/11/2024  For acute back and left leg pain.  She also describes chronic history of headaches behind her eyes as well as a component of cervicogenic headaches.  Patient had no motor weakness or myelopathic findings on exam.  She had no radicular arm pain reported.  She does have a history of prior lumbar fusion in 2015 at L4-L5.  She has history of prior injection therapy in 2021 with what sounds like numerous different types of injections in 2021 along with a course of physical therapy that did completely resolve her back and leg pain until this new reported back and leg pain started with no inciting event.  Her new pain that she is describing is not like any of the other pain she has had before she continues with pain running from her low back buttock and hip region into the posterior lateral aspect of her left leg to the knee with accompanying groin pain.  She continues with headaches and generalized neck pain.  Her headaches are mainly described as behind her eyes and reports she does not wear her glasses due to the pain.  She reports that the Medrol Dosepak offered minimal amount of improvement.  She is utilize narcotic pain medication in the past with no improvement and not interested in any narcotic pain medication therapy.    History of Present Illness    The following portions of the patient's history were reviewed and updated as appropriate: allergies, current medications, past family history, past medical history, past social history, past surgical history, and  problem list.    Review of Systems   Constitutional:  Positive for activity change.   HENT: Negative.     Eyes: Negative.    Respiratory: Negative.     Cardiovascular: Negative.    Gastrointestinal: Negative.    Endocrine: Negative.    Genitourinary: Negative.    Musculoskeletal:  Positive for arthralgias, back pain and myalgias.   Skin: Negative.    Allergic/Immunologic: Negative.    Neurological:  Positive for weakness (left leg).        Tingling/left hip and Groin/knee   Hematological: Negative.    Psychiatric/Behavioral:  Positive for sleep disturbance.    All other systems reviewed and are negative.      Objective     .There were no vitals taken for this visit.   There is no height or weight on file to calculate BMI.        Assessment & Plan   Independent Review of Radiographic Studies:      I personally reviewed the images from the following studies.    MRI cervical spine    Slight anterolisthesis of C3.  Multiple levels of disc degeneration and mild disc bulges and facet arthropathy that combined to result in bilateral foraminal narrowing.  No significant canal narrowing or cord compression noted.      MRI lumbar spine    Adjacent segment disease changes mainly noted at L3-L4.  There is a slight anterolisthesis of L3 on L4.  Severe lateral recess narrowing at L2-L3 mainly noted on the left.    Medical Decision Making:      Ms. Xavier is a 69-year-old female being seen via telehealth visit for review of her imaging obtained after being evaluated on 7/11/2024.  Cervical MRI imaging demonstrates no overt surgical pathology.  There is no high-grade stenosis noted.  Patient without any radicular pain.  Her headaches may be multifactorial including symptoms more consistent with migrainous type headache and possible component of cervicogenic headaches.  continued medical management for this is recommended.  In regards to her back and left leg pain, the MRI of the lumbar spine does show evidence for adjacent segment  disease changes at L3-L4 with fairly moderate to severe canal stenosis at L2-L3 with some lateral recess narrowing along the left possibly affecting the L3 nerve root.  I am not overtly convinced that this is the pain generator she is complaining about as she does have a posterior leg component and feel that based on last exam that a component of sacroiliitis cannot be ruled out.  Given the acuity of her symptoms, I recommend that she engage with injection therapy again.  She does an appointment with pain management tomorrow.  I told her that if her symptoms worsen or fail to better with injection therapy that she follow-up with Dr. Rae.  She agrees to plan of care and wishes to proceed.      Diagnoses and all orders for this visit:    1. DDD (degenerative disc disease), lumbar (Primary)    2. Sacroiliitis    3. Cervicogenic headache    4. DDD (degenerative disc disease), cervical      Return if symptoms worsen or fail to improve.           Sonay Omalley, FRANCISCO  07/31/24  13:03 EDT

## 2024-08-01 ENCOUNTER — OFFICE VISIT (OUTPATIENT)
Dept: PAIN MEDICINE | Facility: CLINIC | Age: 69
End: 2024-08-01
Payer: MEDICARE

## 2024-08-01 ENCOUNTER — TELEPHONE (OUTPATIENT)
Dept: PAIN MEDICINE | Facility: HOSPITAL | Age: 69
End: 2024-08-01
Payer: MEDICARE

## 2024-08-01 VITALS
SYSTOLIC BLOOD PRESSURE: 151 MMHG | RESPIRATION RATE: 16 BRPM | OXYGEN SATURATION: 99 % | HEART RATE: 53 BPM | DIASTOLIC BLOOD PRESSURE: 77 MMHG

## 2024-08-01 DIAGNOSIS — M43.10 SPONDYLOLISTHESIS, ACQUIRED: ICD-10-CM

## 2024-08-01 DIAGNOSIS — M51.36 DDD (DEGENERATIVE DISC DISEASE), LUMBAR: ICD-10-CM

## 2024-08-01 DIAGNOSIS — R51.9 FREQUENT HEADACHES: Primary | ICD-10-CM

## 2024-08-01 DIAGNOSIS — G70.00 MYASTHENIA GRAVIS: ICD-10-CM

## 2024-08-01 PROCEDURE — G0463 HOSPITAL OUTPT CLINIC VISIT: HCPCS | Performed by: STUDENT IN AN ORGANIZED HEALTH CARE EDUCATION/TRAINING PROGRAM

## 2024-08-01 RX ORDER — CARBAMAZEPINE 100 MG/1
100 TABLET, CHEWABLE ORAL 3 TIMES DAILY
Qty: 45 TABLET | Refills: 0 | Status: SHIPPED | OUTPATIENT
Start: 2024-08-01

## 2024-08-01 RX ORDER — DULOXETIN HYDROCHLORIDE 20 MG/1
20 CAPSULE, DELAYED RELEASE ORAL DAILY
Qty: 30 CAPSULE | Refills: 0 | Status: SHIPPED | OUTPATIENT
Start: 2024-08-01

## 2024-08-02 ENCOUNTER — TELEPHONE (OUTPATIENT)
Dept: NEUROSURGERY | Facility: CLINIC | Age: 69
End: 2024-08-02

## 2024-08-02 NOTE — TELEPHONE ENCOUNTER
[facility] received a fax that requires your attention. The document has been indexed to the patient’s chart for your review.      Reason for sending: TO : MRI C-SPINE WO @[facility] 07/24/24    Documents Description: MRI C-SPINE WO _[facility]_07/24/24    Name of Sender: [facility]    Date Indexed: 08/02/24

## 2024-08-04 NOTE — PROGRESS NOTES
CHIEF COMPLAINT  Chief Complaint   Patient presents with    Pain     New Patient- Low back, neck, and head pain.  No narcotics (Pt stopped Xanax)       Primary Care  Janet Garcia APRN Subjective Charlotte M Duc is a 69 y.o. female  who presents for multiple pain complaints.    History of Present Illness  The patient presents for evaluation of multiple medical concerns. She is accompanied by her .    The patient underwent back surgery in 2015 and subsequently received injections from pain management in [facility] for back and sciatic pain radiating down to her ankle. Following a nerve ablation, she experienced a significant exacerbation of her pain, which took approximately 6 months to resolve. Despite undergoing physical therapy, her insurance has since ceased coverage. She has previously received multiple epidural injections. Currently, she experiences pain in her back, which radiates to her groin and down to her knee, accompanied by a tingling sensation that worsens as the day progresses. This tingling sensation is more concerning than the back pain. The pain, which makes her lean over, induces fatigue, impedes her ability to function by the end of the day. She is unable to maintain an upright posture. An MRI of her back was conducted in 06/2023 or 07/2023. She has previously received ozone injections in [place] to reduce swelling. She has purchased a neck traction pillow and performs neck stretch exercises, but is uncertain of their efficacy. She finds relief when wearing a back brace.    The patient has been experiencing chronic headaches since the spring, which have been causing her to feel depressed and cry constantly. A CT scan of her chest and sinuses was scheduled due to her headaches, both of which have been performed. She has a history of sinus surgery. She describes the pain as a vibration in her head, which radiates to her teeth and the right side of her face, primarily on the right  side. She occasionally experiences pain on the left side, but it is not as severe as the right side. She lived in [facility] for 2 years from 2016 to 2018, during which she wore a neck brace while driving due to bumpy roads. She used to sew and read, but had to discontinue these activities. She takes Lunesta, MiraLAX, 2 melatonin, and 1 muscle relaxant at night, which provides relief. She does not experience pain while sleeping. She was prescribed Flexeril by Ms. Garcia, which did not provide relief for her headaches. She takes 2 extra strength ibuprofen and 3 ibuprofen, which provides some relief, but the pain does not completely resolve. She takes this in the evening when her symptoms are most severe. She has tried butalbital and acetaminophen, which did not provide relief. She was prescribed tramadol by her primary care physician, which did not provide relief and caused stomach upset. She has also tried gabapentin and Neurontin for back flare-ups, but these have not improved her headaches. She reports soreness in her head, sensitivity, and soreness, and sensitivity to cluttering kitchen noises, doors banging, and driving over rough roads. She describes the pain as an electric shock. She has a history of jaw problems. Her hearing has improved, but she is unsure if there is a difference in the right ear versus the left. She experienced intermittent vertigo a week ago, which resolved after an hour. She has a history of shingles twice on the side of her face. She had implants and a sinus lift, which resulted in sensitivity on one side of her face. She has an upcoming appointment with an ENT specialist at the end of the month.    The patient reports feeling cold and sick half of the time, including nausea. She has lost approximately 10 pounds in the last 4 to 5 weeks. Her appetite is diminished, and she questions if this could be due to anxiety. She had a wellness check 2 to 3 months ago, which was normal. She has been on  a slow dose of thyroid medication for years. She reports significant stomach stress and has started taking probiotics.    FAMILY HISTORY  Her mother  of cancer in her back. Her sister has migraines.      History of Present Illness     Location: Low back, leg, scalp  Onset: Years ago  Duration: Worsening  Timing: Constant throughout the day  Quality: Aching, sharp, stabbing, tingling  Severity: Today: 6       Last Week: 6       Worst: 8  Modifying Factors: The pain is worse throughout the day, and improves with sleep  Functional Deficit: The pain makes activities of daily living difficult    Physical Therapy: yes    Interval Update 2024:     The following portions of the patient's history were reviewed and updated as appropriate: allergies, current medications, past family history, past medical history, past social history, past surgical history, and problem list.    Procedures:        Current Outpatient Medications:     aspirin 81 MG EC tablet, Take 1 tablet by mouth Daily., Disp: 30 tablet, Rfl: 11    atorvastatin (Lipitor) 20 MG tablet, Take 1 tablet by mouth Daily., Disp: 30 tablet, Rfl: 11    eszopiclone (LUNESTA) 2 MG tablet, 1 tablet Every Night., Disp: , Rfl:     fluticasone (FLONASE) 50 MCG/ACT nasal spray, 2 sprays Daily., Disp: , Rfl:     gabapentin (NEURONTIN) 100 MG capsule, Take 1 capsule by mouth 3 (Three) Times a Day As Needed (back pain)., Disp: , Rfl:     levothyroxine (SYNTHROID, LEVOTHROID) 50 MCG tablet, Take 1 tablet by mouth Daily., Disp: , Rfl:     methocarbamol (ROBAXIN) 500 MG tablet, take 2 tablets by mouth at bedtime as needed, Disp: , Rfl:     metoprolol succinate XL (TOPROL-XL) 25 MG 24 hr tablet, Take 1 tablet by mouth Daily., Disp: , Rfl:     potassium gluconate 595 (99 K) MG tablet tablet, POTASSIUM GLUCONATE 595 (99 K) MG TABS, Disp: , Rfl:     carBAMazepine (TEGretol) 100 MG chewable tablet, Chew 1 tablet 3 (Three) Times a Day., Disp: 45 tablet, Rfl: 0    DULoxetine  (CYMBALTA) 20 MG capsule, Take 1 capsule by mouth Daily., Disp: 30 capsule, Rfl: 0    Review of Systems   HENT:  Positive for congestion, ear pain, hearing loss, sinus pressure and sinus pain.    Musculoskeletal:  Positive for arthralgias, back pain, gait problem, myalgias, neck pain and neck stiffness. Negative for joint swelling.   Neurological:  Positive for weakness, numbness and headaches.     Vitals:    08/01/24 0900   BP: 151/77   Pulse: 53   Resp: 16   SpO2: 99%   PainSc:   6       Urine Drug Screen:   Appropriate: Deferred    Objective   Physical Exam  Vitals and nursing note reviewed. Exam conducted with a chaperone present.   Constitutional:       Appearance: Normal appearance. She is well-developed and normal weight.   HENT:      Head: Normocephalic.      Jaw: Tenderness and pain on movement present.      Comments: Significant scalp tenderness  Neck:      Trachea: No tracheal deviation.   Musculoskeletal:      Cervical back: Normal range of motion and neck supple. No tenderness.      Comments: Lumbar Spine Exam:  Tender to palpation over the lumbar paraspinal musculature Yes  Limited range of motion secondary to pain Yes  Facet loading positive: bilateral  Facets tender to palpation: bilateral  Straight leg raise test positive: Equivocal       Neurological:      Mental Status: She is alert.      Sensory: No sensory deficit.      Motor: No weakness.       Assessment & Plan   Problems Addressed this Visit       Spondylolisthesis, acquired    Relevant Orders    Facet    DDD (degenerative disc disease), lumbar    Relevant Orders    Facet     Other Visit Diagnoses       Frequent headaches    -  Primary    Relevant Orders    MRI Brain With & Without Contrast    Myasthenia gravis        Relevant Orders    Myasthenia Gravis Profile          Diagnoses         Codes Comments    Frequent headaches    -  Primary ICD-10-CM: R51.9  ICD-9-CM: 784.0     Myasthenia gravis     ICD-10-CM: G70.00  ICD-9-CM: 358.00     DDD  (degenerative disc disease), lumbar     ICD-10-CM: M51.36  ICD-9-CM: 722.52     Spondylolisthesis, acquired     ICD-10-CM: M43.10  ICD-9-CM: 738.4             Plan:  Assessment & Plan  1. Low back pain.  The patient's MRI reveals a significant curvature, likely attributable to her previous back surgery. A significant disc protrusion at L1-L2, which is likely contributing to her pain down the front side of the leg, accompanied by numbness and tingling. Additionally, she has adjacent segment disease and arthritis. A repeat RFA will be conducted. An MRI of the brain and ears will be ordered. A myasthenia gravis panel will be ordered. Tegretol will be prescribed, to be taken once daily.    -We can try to repeat RFA with sedation at L4/5 and L5/S1 given significant benefit in the past    2. Headaches.  The patient's headaches do not align with a specific nerve distribution, suggest a possible diagnosis of migraine headaches.    -Can try tegratol for possible trigeminal neuralgia    3. Depression.  A prescription for Cymbalta 30 mg will be provided.      --- Follow-up Next available for B/L L4/5, L5/S1 RFA with sedation       Approximately 73 minutes was spent on this encounter including reviewing multiple medical records, evaluating multiple medical issues, patient education, physical exam, and patient history taking    Patient or patient representative verbalized consent for the use of Ambient Listening during the visit with  Moses Madrid MD for chart documentation. 8/4/2024  15:13 EDT    INSPECT REPORT    As part of the patient's treatment plan, I may be prescribing controlled substances. The patient has been made aware of appropriate use of such medications, including potential risk of somnolence, limited ability to drive and/or work safely, and the potential for dependence or overdose. It has also bee made clear that these medications are for use by this patient only, without concomitant use of alcohol or other  substances unless prescribed.     Patient has completed prescribing agreement detailing terms of continued prescribing of controlled substances, including monitoring JOHANA reports, urine drug screening, and pill counts if necessary. The patient is aware that inappropriate use will results in cessation of prescribing such medications.    INSPECT report has been reviewed and scanned into the patient's chart.    As the clinician, I personally reviewed the INSPECT from 7/30/24 .    History and physical exam exhibit continued safe and appropriate use of controlled substances.      EMR Dragon/Transcription disclaimer:   Much of this encounter note is an electronic transcription/translation of spoken language to printed text. The electronic translation of spoken language may permit erroneous, or at times, nonsensical words or phrases to be inadvertently transcribed; Although I have reviewed the note for such errors, some may still exist.

## 2024-08-15 ENCOUNTER — OFFICE VISIT (OUTPATIENT)
Dept: PAIN MEDICINE | Facility: CLINIC | Age: 69
End: 2024-08-15
Payer: MEDICARE

## 2024-08-15 VITALS
RESPIRATION RATE: 16 BRPM | OXYGEN SATURATION: 98 % | HEART RATE: 52 BPM | DIASTOLIC BLOOD PRESSURE: 87 MMHG | SYSTOLIC BLOOD PRESSURE: 160 MMHG

## 2024-08-15 DIAGNOSIS — M51.36 DDD (DEGENERATIVE DISC DISEASE), LUMBAR: ICD-10-CM

## 2024-08-15 DIAGNOSIS — M43.10 SPONDYLOLISTHESIS, ACQUIRED: ICD-10-CM

## 2024-08-15 DIAGNOSIS — R51.9 FREQUENT HEADACHES: Primary | ICD-10-CM

## 2024-08-15 PROCEDURE — G0463 HOSPITAL OUTPT CLINIC VISIT: HCPCS | Performed by: STUDENT IN AN ORGANIZED HEALTH CARE EDUCATION/TRAINING PROGRAM

## 2024-08-15 RX ORDER — CARBAMAZEPINE 200 MG/1
200 TABLET ORAL 2 TIMES DAILY
Qty: 60 TABLET | Refills: 0 | Status: SHIPPED | OUTPATIENT
Start: 2024-08-15

## 2024-08-15 RX ORDER — DULOXETIN HYDROCHLORIDE 20 MG/1
20 CAPSULE, DELAYED RELEASE ORAL DAILY
Qty: 30 CAPSULE | Refills: 0 | Status: SHIPPED | OUTPATIENT
Start: 2024-08-15

## 2024-08-15 NOTE — PROGRESS NOTES
CHIEF COMPLAINT  Chief Complaint   Patient presents with    Pain     No Narcotics       Primary Care  Jose, FRANCISCO Payne RAIN Xavier is a 69 y.o. female  who presents for multiple pain complaints.    History of Present Illness  The patient presents for evaluation of multiple medical concerns. She is accompanied by her .    The patient underwent back surgery in 2015 and subsequently received injections from pain management in [facility] for back and sciatic pain radiating down to her ankle. Following a nerve ablation, she experienced a significant exacerbation of her pain, which took approximately 6 months to resolve. Despite undergoing physical therapy, her insurance has since ceased coverage. She has previously received multiple epidural injections. Currently, she experiences pain in her back, which radiates to her groin and down to her knee, accompanied by a tingling sensation that worsens as the day progresses. This tingling sensation is more concerning than the back pain. The pain, which makes her lean over, induces fatigue, impedes her ability to function by the end of the day. She is unable to maintain an upright posture. An MRI of her back was conducted in 06/2023 or 07/2023. She has previously received ozone injections in [place] to reduce swelling. She has purchased a neck traction pillow and performs neck stretch exercises, but is uncertain of their efficacy. She finds relief when wearing a back brace.    The patient has been experiencing chronic headaches since the spring, which have been causing her to feel depressed and cry constantly. A CT scan of her chest and sinuses was scheduled due to her headaches, both of which have been performed. She has a history of sinus surgery. She describes the pain as a vibration in her head, which radiates to her teeth and the right side of her face, primarily on the right side. She occasionally experiences pain on the left side, but it is  not as severe as the right side. She lived in [facility] for 2 years from 2016 to 2018, during which she wore a neck brace while driving due to bumpy roads. She used to sew and read, but had to discontinue these activities. She takes Lunesta, MiraLAX, 2 melatonin, and 1 muscle relaxant at night, which provides relief. She does not experience pain while sleeping. She was prescribed Flexeril by Ms. Manningoley, which did not provide relief for her headaches. She takes 2 extra strength ibuprofen and 3 ibuprofen, which provides some relief, but the pain does not completely resolve. She takes this in the evening when her symptoms are most severe. She has tried butalbital and acetaminophen, which did not provide relief. She was prescribed tramadol by her primary care physician, which did not provide relief and caused stomach upset. She has also tried gabapentin and Neurontin for back flare-ups, but these have not improved her headaches. She reports soreness in her head, sensitivity, and soreness, and sensitivity to cluttering kitchen noises, doors banging, and driving over rough roads. She describes the pain as an electric shock. She has a history of jaw problems. Her hearing has improved, but she is unsure if there is a difference in the right ear versus the left. She experienced intermittent vertigo a week ago, which resolved after an hour. She has a history of shingles twice on the side of her face. She had implants and a sinus lift, which resulted in sensitivity on one side of her face. She has an upcoming appointment with an ENT specialist at the end of the month.    The patient reports feeling cold and sick half of the time, including nausea. She has lost approximately 10 pounds in the last 4 to 5 weeks. Her appetite is diminished, and she questions if this could be due to anxiety. She had a wellness check 2 to 3 months ago, which was normal. She has been on a slow dose of thyroid medication for years. She reports  significant stomach stress and has started taking probiotics.    FAMILY HISTORY  Her mother  of cancer in her back. Her sister has migraines.      History of Present Illness     Location: Low back, leg, scalp  Onset: Years ago  Duration: Worsening  Timing: Constant throughout the day  Quality: Aching, sharp, stabbing, tingling  Severity: Today: 6       Last Week: 6       Worst: 8  Modifying Factors: The pain is worse throughout the day, and improves with sleep  Functional Deficit: The pain makes activities of daily living difficult    Physical Therapy: yes    Interval Update 08/15/2024: She reports her mood is better on the Cymbalta.  She has had some difficulty sleeping but reports that overall, her mental status has improved.  Does appear to be getting some benefit with regards to the facial pain and trigeminal neuralgia on the Tegretol 100 mg 3 times daily.  Unfortunately, her RFA is currently pending with insurance.    The following portions of the patient's history were reviewed and updated as appropriate: allergies, current medications, past family history, past medical history, past social history, past surgical history, and problem list.    Procedures:        Current Outpatient Medications:     aspirin 81 MG EC tablet, Take 1 tablet by mouth Daily., Disp: 30 tablet, Rfl: 11    atorvastatin (Lipitor) 20 MG tablet, Take 1 tablet by mouth Daily., Disp: 30 tablet, Rfl: 11    DULoxetine (CYMBALTA) 20 MG capsule, Take 1 capsule by mouth Daily., Disp: 30 capsule, Rfl: 0    eszopiclone (LUNESTA) 2 MG tablet, 1 tablet Every Night., Disp: , Rfl:     fluticasone (FLONASE) 50 MCG/ACT nasal spray, 2 sprays Daily., Disp: , Rfl:     gabapentin (NEURONTIN) 100 MG capsule, Take 1 capsule by mouth 3 (Three) Times a Day As Needed (back pain)., Disp: , Rfl:     levothyroxine (SYNTHROID, LEVOTHROID) 50 MCG tablet, Take 1 tablet by mouth Daily., Disp: , Rfl:     methocarbamol (ROBAXIN) 500 MG tablet, take 2 tablets by mouth  at bedtime as needed, Disp: , Rfl:     metoprolol succinate XL (TOPROL-XL) 25 MG 24 hr tablet, Take 1 tablet by mouth Daily., Disp: , Rfl:     potassium gluconate 595 (99 K) MG tablet tablet, POTASSIUM GLUCONATE 595 (99 K) MG TABS, Disp: , Rfl:     carBAMazepine (TEGretol) 200 MG tablet, Take 1 tablet by mouth 2 (Two) Times a Day., Disp: 60 tablet, Rfl: 0    Review of Systems   HENT:  Positive for congestion, ear pain, hearing loss, sinus pressure and sinus pain.    Musculoskeletal:  Positive for arthralgias, back pain, gait problem, myalgias, neck pain and neck stiffness. Negative for joint swelling.   Neurological:  Positive for weakness, numbness and headaches.       Vitals:    08/15/24 1030   BP: 160/87   Pulse: 52   Resp: 16   SpO2: 98%   PainSc:   7       Urine Drug Screen:   Appropriate: Deferred    Objective   Physical Exam  Vitals and nursing note reviewed. Exam conducted with a chaperone present.   Constitutional:       Appearance: Normal appearance. She is well-developed and normal weight.   HENT:      Head: Normocephalic.      Jaw: Tenderness and pain on movement present.      Comments: Significant scalp tenderness  Neck:      Trachea: No tracheal deviation.   Musculoskeletal:      Cervical back: Normal range of motion and neck supple. No tenderness.      Comments: Lumbar Spine Exam:  Tender to palpation over the lumbar paraspinal musculature Yes  Limited range of motion secondary to pain Yes  Facet loading positive: bilateral  Facets tender to palpation: bilateral  Straight leg raise test positive: Equivocal       Neurological:      Mental Status: She is alert.      Sensory: No sensory deficit.      Motor: No weakness.         Assessment & Plan   Problems Addressed this Visit       Spondylolisthesis, acquired    DDD (degenerative disc disease), lumbar     Other Visit Diagnoses       Frequent headaches    -  Primary          Diagnoses         Codes Comments    Frequent headaches    -  Primary ICD-10-CM:  R51.9  ICD-9-CM: 784.0     DDD (degenerative disc disease), lumbar     ICD-10-CM: M51.36  ICD-9-CM: 722.52     Spondylolisthesis, acquired     ICD-10-CM: M43.10  ICD-9-CM: 738.4             Plan:  We can plan to repeat her bilateral L4-5 and L5-S1 lumbar RFA.  As stated above, she has primarily axial low back pain on physical exam with positive facet loading maneuvers bilaterally.  Previously, in 2021 she underwent the same procedure and reports excellent, 80 to 90% pain relief for approximately 18 months.  Given this, she wishes to repeat the procedure  I will increase her Tegretol to 200 mg twice daily  Continue duloxetine 20 mg daily  She can try 300 mg nightly gabapentin to help with some difficulty sleeping    --- Follow-up Next available for B/L L4/5, L5/S1 RFA with sedation       Approximately 73 minutes was spent on this encounter including reviewing multiple medical records, evaluating multiple medical issues, patient education, physical exam, and patient history taking    Patient or patient representative verbalized consent for the use of Ambient Listening during the visit with  Moses Madrid MD for chart documentation. 8/15/2024  15:13 EDT    INSPECT REPORT    As part of the patient's treatment plan, I may be prescribing controlled substances. The patient has been made aware of appropriate use of such medications, including potential risk of somnolence, limited ability to drive and/or work safely, and the potential for dependence or overdose. It has also bee made clear that these medications are for use by this patient only, without concomitant use of alcohol or other substances unless prescribed.     Patient has completed prescribing agreement detailing terms of continued prescribing of controlled substances, including monitoring JOHANA reports, urine drug screening, and pill counts if necessary. The patient is aware that inappropriate use will results in cessation of prescribing such  medications.    INSPECT report has been reviewed and scanned into the patient's chart.    As the clinician, I personally reviewed the INSPECT from 8/13/2024.    History and physical exam exhibit continued safe and appropriate use of controlled substances.      EMR Dragon/Transcription disclaimer:   Much of this encounter note is an electronic transcription/translation of spoken language to printed text. The electronic translation of spoken language may permit erroneous, or at times, nonsensical words or phrases to be inadvertently transcribed; Although I have reviewed the note for such errors, some may still exist.

## 2024-08-20 ENCOUNTER — TELEPHONE (OUTPATIENT)
Dept: PAIN MEDICINE | Facility: HOSPITAL | Age: 69
End: 2024-08-20
Payer: MEDICARE

## 2024-08-20 ENCOUNTER — TELEPHONE (OUTPATIENT)
Dept: PAIN MEDICINE | Facility: CLINIC | Age: 69
End: 2024-08-20
Payer: MEDICARE

## 2024-08-20 NOTE — TELEPHONE ENCOUNTER
Pre procedure call made, spoke with patient and she is aware to arrive by 0845 and that a  is required. NPO status also reviewed.

## 2024-08-20 NOTE — TELEPHONE ENCOUNTER
I am writing to formally appeal the recent decision regarding my patient's insurance coverage for ongoing treatment related to her chronic pain management, specifically for the lumbar radiofrequency ablation (RFA) that was previously covered.    As you are aware, she has been undergoing continuous physical therapy in an effort to manage her condition. Unfortunately, despite her commitment to this treatment, the pain has not improved. Ms. Xavier currently experiences severe axial low back pain, which is aggravated by routine activities, and she has been unable to maintain an upright posture without leaning over due to the discomfort. This pain induces significant fatigue which impedes her ability to function effectively by the end of the day, severely diminishing her quality of life.    In March and April of 2021, she underwent a series of lumbar medial branch blocks (MBBs) and a lumbar radiofrequency ablation for the L4-5 and L5-S1 regions. After these procedures, she was fortunate to experience 100% pain relief that lasted over two years, allowing her to significantly improve daily activities and regain a sense of normalcy in life. However, since June 2024, she has experienced a resurgence of pain that has considerably impacted daily life and activities.    Due to the overwhelming success of the previous RFA, I am requesting coverage for a repeat procedure. I am confident that a repeat of the bilateral L4-5 and L5-S1 lumbar RFA will provide the same level of relief that she experienced previously, thus restoring her ability to live life fully.     I kindly ask that you reconsider your previous decision and approve coverage for this essential treatment.

## 2024-08-22 ENCOUNTER — HOSPITAL ENCOUNTER (OUTPATIENT)
Dept: MRI IMAGING | Facility: HOSPITAL | Age: 69
Discharge: HOME | End: 2024-08-22
Admitting: STUDENT IN AN ORGANIZED HEALTH CARE EDUCATION/TRAINING PROGRAM
Payer: MEDICARE

## 2024-08-22 DIAGNOSIS — R51.9 FREQUENT HEADACHES: ICD-10-CM

## 2024-08-22 PROCEDURE — A9579 GAD-BASE MR CONTRAST NOS,1ML: HCPCS | Performed by: STUDENT IN AN ORGANIZED HEALTH CARE EDUCATION/TRAINING PROGRAM

## 2024-08-22 PROCEDURE — 70553 MRI BRAIN STEM W/O & W/DYE: CPT

## 2024-08-22 PROCEDURE — 25010000002 GADOTERIDOL PER 1 ML: Performed by: STUDENT IN AN ORGANIZED HEALTH CARE EDUCATION/TRAINING PROGRAM

## 2024-08-22 RX ADMIN — GADOTERIDOL 12 ML: 279.3 INJECTION, SOLUTION INTRAVENOUS at 09:51

## 2024-08-23 ENCOUNTER — TELEPHONE (OUTPATIENT)
Dept: PAIN MEDICINE | Facility: CLINIC | Age: 69
End: 2024-08-23
Payer: MEDICARE

## 2024-08-23 NOTE — TELEPHONE ENCOUNTER
SPOKE WITH PATIENT INFORMED HER THAT WE FILED A RESUBMISSION TO MEDICARE ON 8/20/24 AND IT TAKES 10 BUINESS DAYS TO GET A DECISION.

## 2024-08-26 ENCOUNTER — HOSPITAL ENCOUNTER (OUTPATIENT)
Dept: CARDIOLOGY | Facility: HOSPITAL | Age: 69
Discharge: HOME | End: 2024-08-26
Admitting: SURGERY
Payer: MEDICARE

## 2024-08-26 ENCOUNTER — OFFICE VISIT (OUTPATIENT)
Age: 69
End: 2024-08-26
Payer: MEDICARE

## 2024-08-26 VITALS
DIASTOLIC BLOOD PRESSURE: 68 MMHG | SYSTOLIC BLOOD PRESSURE: 171 MMHG | HEIGHT: 65 IN | BODY MASS INDEX: 21.66 KG/M2 | WEIGHT: 130 LBS

## 2024-08-26 DIAGNOSIS — I65.23 CAROTID STENOSIS, BILATERAL: ICD-10-CM

## 2024-08-26 DIAGNOSIS — I65.23 CAROTID STENOSIS, BILATERAL: Primary | ICD-10-CM

## 2024-08-26 LAB
BH CV XLRA MEAS LEFT DIST CCA EDV: -14.9 CM/SEC
BH CV XLRA MEAS LEFT DIST CCA PSV: -67.4 CM/SEC
BH CV XLRA MEAS LEFT DIST ICA EDV: -19.3 CM/SEC
BH CV XLRA MEAS LEFT DIST ICA PSV: -72.1 CM/SEC
BH CV XLRA MEAS LEFT ICA/CCA RATIO: 1.8
BH CV XLRA MEAS LEFT PROX CCA EDV: 13.9 CM/SEC
BH CV XLRA MEAS LEFT PROX CCA PSV: 78.9 CM/SEC
BH CV XLRA MEAS LEFT PROX ECA PSV: -77.8 CM/SEC
BH CV XLRA MEAS LEFT PROX ICA EDV: -29.5 CM/SEC
BH CV XLRA MEAS LEFT PROX ICA PSV: -140 CM/SEC
BH CV XLRA MEAS LEFT PROX SCLA PSV: 147 CM/SEC
BH CV XLRA MEAS LEFT VERTEBRAL A PSV: -31.7 CM/SEC
BH CV XLRA MEAS RIGHT DIST CCA EDV: -14.9 CM/SEC
BH CV XLRA MEAS RIGHT DIST CCA PSV: -70.2 CM/SEC
BH CV XLRA MEAS RIGHT DIST ICA EDV: -19.2 CM/SEC
BH CV XLRA MEAS RIGHT DIST ICA PSV: -81.2 CM/SEC
BH CV XLRA MEAS RIGHT ICA/CCA RATIO: 0.9
BH CV XLRA MEAS RIGHT PROX CCA EDV: 17.4 CM/SEC
BH CV XLRA MEAS RIGHT PROX CCA PSV: 87 CM/SEC
BH CV XLRA MEAS RIGHT PROX ECA PSV: -70.8 CM/SEC
BH CV XLRA MEAS RIGHT PROX ICA EDV: -15.4 CM/SEC
BH CV XLRA MEAS RIGHT PROX ICA PSV: -79.6 CM/SEC
BH CV XLRA MEAS RIGHT PROX SCLA PSV: 137 CM/SEC
BH CV XLRA MEAS RIGHT VERTEBRAL A PSV: -37.3 CM/SEC

## 2024-08-26 PROCEDURE — 3077F SYST BP >= 140 MM HG: CPT | Performed by: SURGERY

## 2024-08-26 PROCEDURE — 1159F MED LIST DOCD IN RCRD: CPT | Performed by: SURGERY

## 2024-08-26 PROCEDURE — 99214 OFFICE O/P EST MOD 30 MIN: CPT | Performed by: SURGERY

## 2024-08-26 PROCEDURE — 3078F DIAST BP <80 MM HG: CPT | Performed by: SURGERY

## 2024-08-26 PROCEDURE — 1160F RVW MEDS BY RX/DR IN RCRD: CPT | Performed by: SURGERY

## 2024-08-26 PROCEDURE — 93880 EXTRACRANIAL BILAT STUDY: CPT | Performed by: SURGERY

## 2024-08-26 PROCEDURE — 93880 EXTRACRANIAL BILAT STUDY: CPT

## 2024-08-26 NOTE — PROGRESS NOTES
"Chief Complaint  Carotid Artery Disease (Follow up exam with CTD scan same day)    Subjective        Adelina Xavier presents to [facility] VASCULAR SURGERY  History of Present Illness    Follow-up patient evaluation for incidentally identified carotid artery stenosis.  Denies any strokes mini strokes or amaurosis fugax    Objective   Vital Signs:  /68 (BP Location: Right arm, Patient Position: Sitting)   Ht 165.1 cm (65\")   Wt 59 kg (130 lb)   BMI 21.63 kg/m²   Estimated body mass index is 21.63 kg/m² as calculated from the following:    Height as of this encounter: 165.1 cm (65\").    Weight as of this encounter: 59 kg (130 lb).     BMI is within normal parameters. No other follow-up for BMI required.    Adelina Xavier  reports that she has never smoked. She has never been exposed to tobacco smoke. She has never used smokeless tobacco.      Physical Exam  Constitutional:       Appearance: She is well-developed.   Pulmonary:      Effort: Pulmonary effort is normal. No respiratory distress.   Abdominal:      General: There is no distension.      Palpations: Abdomen is soft.   Neurological:      Mental Status: She is alert and oriented to person, place, and time.        Result Review :    The following data was reviewed by: Pito Parsons MD on 08/26/24          Data reviewed : Cardiology studies as below.  Vascular Surgical History:    Vascular Imaging History:  Carotid duplex:  8/26/2024: Right side less than 50% stenosis, left side less than 50% stenosis (140/30 with ratio of 1.8)    CT angiogram of the head and neck 7/6/2024 at [facility].  Approximately 50% stenosis of the left carotid artery.         Assessment and Plan     Vascular surgery following for:  Carotid stenosis    Diagnoses and all orders for this visit:    1. Carotid stenosis, bilateral (Primary)      Patient with just under 50% left-sided carotid stenosis seen on duplex.  This was an incidental finding.  " She is neurologically asymptomatic.  She is on good medical therapy.  I recommended 2-year interval duplex follow-up.  All questions answered.    Pathophysiology of carotid artery disease discussed.  Patient understands for nonflow-limiting disease of the carotid in an asymptomatic patient noninterventional therapy is recommended.  Medical optimization is best practice.  We discussed lifestyle modifications as needed.  Patient understands that carotid blockages require periodic surveillance and if blockage progress and/or patient becomes symptomatic this would require reevaluation.  While medical therapy has some risk the lowest risk path forward is noninterventional/medical therapy.       Vascular medical management: Patient was prescribed aspirin 81 mg daily and Lipitor 20 mg daily.  These were sent to her pharmacy in [facility] in [facility].  Follow Up     No follow-ups on file.  Patient was given instructions and counseling regarding her condition or for health maintenance advice. Please see specific information pulled into the AVS if appropriate.

## 2024-08-27 ENCOUNTER — OFFICE VISIT (OUTPATIENT)
Dept: PAIN MEDICINE | Facility: CLINIC | Age: 69
End: 2024-08-27
Payer: MEDICARE

## 2024-08-27 ENCOUNTER — TELEPHONE (OUTPATIENT)
Dept: PAIN MEDICINE | Facility: CLINIC | Age: 69
End: 2024-08-27
Payer: MEDICARE

## 2024-08-27 VITALS
OXYGEN SATURATION: 97 % | HEART RATE: 51 BPM | SYSTOLIC BLOOD PRESSURE: 160 MMHG | BODY MASS INDEX: 21.63 KG/M2 | RESPIRATION RATE: 16 BRPM | WEIGHT: 130 LBS | DIASTOLIC BLOOD PRESSURE: 76 MMHG

## 2024-08-27 DIAGNOSIS — M43.10 SPONDYLOLISTHESIS, ACQUIRED: ICD-10-CM

## 2024-08-27 DIAGNOSIS — R51.9 FREQUENT HEADACHES: ICD-10-CM

## 2024-08-27 DIAGNOSIS — M51.36 DDD (DEGENERATIVE DISC DISEASE), LUMBAR: Primary | ICD-10-CM

## 2024-08-27 PROCEDURE — 3078F DIAST BP <80 MM HG: CPT | Performed by: STUDENT IN AN ORGANIZED HEALTH CARE EDUCATION/TRAINING PROGRAM

## 2024-08-27 PROCEDURE — 3077F SYST BP >= 140 MM HG: CPT | Performed by: STUDENT IN AN ORGANIZED HEALTH CARE EDUCATION/TRAINING PROGRAM

## 2024-08-27 PROCEDURE — 1160F RVW MEDS BY RX/DR IN RCRD: CPT | Performed by: STUDENT IN AN ORGANIZED HEALTH CARE EDUCATION/TRAINING PROGRAM

## 2024-08-27 PROCEDURE — 1159F MED LIST DOCD IN RCRD: CPT | Performed by: STUDENT IN AN ORGANIZED HEALTH CARE EDUCATION/TRAINING PROGRAM

## 2024-08-27 PROCEDURE — 99215 OFFICE O/P EST HI 40 MIN: CPT | Performed by: STUDENT IN AN ORGANIZED HEALTH CARE EDUCATION/TRAINING PROGRAM

## 2024-08-27 PROCEDURE — 1125F AMNT PAIN NOTED PAIN PRSNT: CPT | Performed by: STUDENT IN AN ORGANIZED HEALTH CARE EDUCATION/TRAINING PROGRAM

## 2024-08-27 RX ORDER — PREGABALIN 25 MG/1
25 CAPSULE ORAL 2 TIMES DAILY
Qty: 60 CAPSULE | Refills: 0 | Status: SHIPPED | OUTPATIENT
Start: 2024-08-27

## 2024-08-27 NOTE — TELEPHONE ENCOUNTER
Caller: DALJIT    Relationship: [facility]    Best call back number:     What is the best time to reach you: 9A-7P    Who are you requesting to speak with (clinical staff, provider,  specific staff member): CLINICAL    What was the call regarding: DALJIT STATED SHE NEEDS MORE INFORMATION FOR THE Buprenorphine HCl-Naloxone HCl 0.7-0.18 MG sublingual tablet  THAT WAS PRESCRIBED.   SHE NEEDS TO KNOW:  1) WHAT KIND OF PAIN THE PATIENT IS HAVING  2) WHERE THE PAIN IS  3) WHEN THE PAIN ORIGINATED    Is it okay if the provider responds through [facility]: ASAP

## 2024-08-27 NOTE — PROGRESS NOTES
CHIEF COMPLAINT  Chief Complaint   Patient presents with    Headache     No opioids    Back Pain       Primary Care  Jose, FRANCISCO Payne    Subjective   Adelinajerrica Xavier is a 69 y.o. female  who presents for multiple pain complaints.    History of Present Illness  The patient presents for evaluation of multiple medical concerns. She is accompanied by her .    The patient underwent back surgery in 2015 and subsequently received injections from pain management in [facility] for back and sciatic pain radiating down to her ankle. Following a nerve ablation, she experienced a significant exacerbation of her pain, which took approximately 6 months to resolve. Despite undergoing physical therapy, her insurance has since ceased coverage. She has previously received multiple epidural injections. Currently, she experiences pain in her back, which radiates to her groin and down to her knee, accompanied by a tingling sensation that worsens as the day progresses. This tingling sensation is more concerning than the back pain. The pain, which makes her lean over, induces fatigue, impedes her ability to function by the end of the day. She is unable to maintain an upright posture. An MRI of her back was conducted in 06/2023 or 07/2023. She has previously received ozone injections in [place] to reduce swelling. She has purchased a neck traction pillow and performs neck stretch exercises, but is uncertain of their efficacy. She finds relief when wearing a back brace.    The patient has been experiencing chronic headaches since the spring, which have been causing her to feel depressed and cry constantly. A CT scan of her chest and sinuses was scheduled due to her headaches, both of which have been performed. She has a history of sinus surgery. She describes the pain as a vibration in her head, which radiates to her teeth and the right side of her face, primarily on the right side. She occasionally experiences pain on the left  side, but it is not as severe as the right side. She lived in [facility] for 2 years from 2016 to 2018, during which she wore a neck brace while driving due to bumpy roads. She used to sew and read, but had to discontinue these activities. She takes Lunesta, MiraLAX, 2 melatonin, and 1 muscle relaxant at night, which provides relief. She does not experience pain while sleeping. She was prescribed Flexeril by Ms. Garcia, which did not provide relief for her headaches. She takes 2 extra strength ibuprofen and 3 ibuprofen, which provides some relief, but the pain does not completely resolve. She takes this in the evening when her symptoms are most severe. She has tried butalbital and acetaminophen, which did not provide relief. She was prescribed tramadol by her primary care physician, which did not provide relief and caused stomach upset. She has also tried gabapentin and Neurontin for back flare-ups, but these have not improved her headaches. She reports soreness in her head, sensitivity, and soreness, and sensitivity to cluttering kitchen noises, doors banging, and driving over rough roads. She describes the pain as an electric shock. She has a history of jaw problems. Her hearing has improved, but she is unsure if there is a difference in the right ear versus the left. She experienced intermittent vertigo a week ago, which resolved after an hour. She has a history of shingles twice on the side of her face. She had implants and a sinus lift, which resulted in sensitivity on one side of her face. She has an upcoming appointment with an ENT specialist at the end of the month.    The patient reports feeling cold and sick half of the time, including nausea. She has lost approximately 10 pounds in the last 4 to 5 weeks. Her appetite is diminished, and she questions if this could be due to anxiety. She had a wellness check 2 to 3 months ago, which was normal. She has been on a slow dose of thyroid medication for years. She  reports significant stomach stress and has started taking probiotics.    FAMILY HISTORY  Her mother  of cancer in her back. Her sister has migraines.      History of Present Illness     Location: Low back, leg, scalp  Onset: Years ago  Duration: Worsening  Timing: Constant throughout the day  Quality: Aching, sharp, stabbing, tingling  Severity: Today: 6       Last Week: 6       Worst: 8  Modifying Factors: The pain is worse throughout the day, and improves with sleep  Functional Deficit: The pain makes activities of daily living difficult    Physical Therapy: yes    Interval Update 2024: Unfortunately, we are still pending her RFA with insurance.  She does continue with Tegretol twice daily for what appears to be trigeminal neuralgia with some benefit.  Her MRI brain showed no unusual masses.  She does have extensive microvascular changes and likely chronic ischemic changes.  Is complaining of progressively worsening axial back pain    The following portions of the patient's history were reviewed and updated as appropriate: allergies, current medications, past family history, past medical history, past social history, past surgical history, and problem list.    Procedures:        Current Outpatient Medications:     aspirin 81 MG EC tablet, Take 1 tablet by mouth Daily., Disp: 30 tablet, Rfl: 11    atorvastatin (Lipitor) 20 MG tablet, Take 1 tablet by mouth Daily., Disp: 30 tablet, Rfl: 11    carBAMazepine (TEGretol) 200 MG tablet, Take 1 tablet by mouth 2 (Two) Times a Day., Disp: 60 tablet, Rfl: 0    DULoxetine (CYMBALTA) 20 MG capsule, Take 1 capsule by mouth Daily., Disp: 30 capsule, Rfl: 0    eszopiclone (LUNESTA) 2 MG tablet, 1 tablet Every Night., Disp: , Rfl:     fluticasone (FLONASE) 50 MCG/ACT nasal spray, 2 sprays Daily., Disp: , Rfl:     levothyroxine (SYNTHROID, LEVOTHROID) 50 MCG tablet, Take 1 tablet by mouth Daily., Disp: , Rfl:     methocarbamol (ROBAXIN) 500 MG tablet, take 2 tablets by  mouth at bedtime as needed, Disp: , Rfl:     metoprolol succinate XL (TOPROL-XL) 25 MG 24 hr tablet, Take 1 tablet by mouth Daily., Disp: , Rfl:     potassium gluconate 595 (99 K) MG tablet tablet, POTASSIUM GLUCONATE 595 (99 K) MG TABS, Disp: , Rfl:     Buprenorphine HCl-Naloxone HCl 0.7-0.18 MG sublingual tablet, Place 1 each under the tongue Daily., Disp: 30 tablet, Rfl: 0    pregabalin (Lyrica) 25 MG capsule, Take 1 capsule by mouth 2 (Two) Times a Day., Disp: 60 capsule, Rfl: 0    Review of Systems   HENT:  Positive for congestion, ear pain, hearing loss, sinus pressure and sinus pain.    Musculoskeletal:  Positive for arthralgias, back pain, gait problem, myalgias, neck pain and neck stiffness. Negative for joint swelling.   Neurological:  Positive for weakness, numbness and headaches.       Vitals:    08/27/24 1349   BP: 160/76   Pulse: 51   Resp: 16   SpO2: 97%   Weight: 59 kg (130 lb)   PainSc:   3       Urine Drug Screen:   Appropriate: Deferred    Objective   Physical Exam  Vitals and nursing note reviewed. Exam conducted with a chaperone present.   Constitutional:       Appearance: Normal appearance. She is well-developed and normal weight.   HENT:      Head: Normocephalic.      Jaw: Tenderness and pain on movement present.      Comments: Significant scalp tenderness  Neck:      Trachea: No tracheal deviation.   Musculoskeletal:      Cervical back: Normal range of motion and neck supple. No tenderness.      Comments: Lumbar Spine Exam:  Tender to palpation over the lumbar paraspinal musculature Yes  Limited range of motion secondary to pain Yes  Facet loading positive: bilateral  Facets tender to palpation: bilateral  Straight leg raise test positive: Equivocal       Neurological:      Mental Status: She is alert.      Sensory: No sensory deficit.      Motor: No weakness.         Assessment & Plan   Problems Addressed this Visit       Spondylolisthesis, acquired    Relevant Medications    pregabalin  (Lyrica) 25 MG capsule    Buprenorphine HCl-Naloxone HCl 0.7-0.18 MG sublingual tablet    DDD (degenerative disc disease), lumbar - Primary    Relevant Medications    pregabalin (Lyrica) 25 MG capsule    Buprenorphine HCl-Naloxone HCl 0.7-0.18 MG sublingual tablet     Other Visit Diagnoses       Frequent headaches        Relevant Medications    pregabalin (Lyrica) 25 MG capsule          Diagnoses         Codes Comments    DDD (degenerative disc disease), lumbar    -  Primary ICD-10-CM: M51.36  ICD-9-CM: 722.52     Spondylolisthesis, acquired     ICD-10-CM: M43.10  ICD-9-CM: 738.4     Frequent headaches     ICD-10-CM: R51.9  ICD-9-CM: 784.0             Plan:  Still pending bilateral L4-5 and L5-S1 lumbar RFA.  As before, she got excellt, 90% benefit for nearly 2 years  Continue Tegretol at 200 mg twice daily  Continue duloxetine as she is doing much better  She is getting limited benefit with gabapentin and is requesting to switch to Lyrica.  We can try Lyrica 25 mg twice daily  As she has worsening primarily axial low back pain and gets limited benefit with tramadol, we can try very low-dose, 0.7 mg buprenorphine once daily.     --- Follow-up Next available for B/L L4/5, L5/S1 RFA with sedation       Approximately 73 minutes was spent on this encounter including reviewing multiple medical records, evaluating multiple medical issues, patient education, physical exam, and patient history taking    Patient or patient representative verbalized consent for the use of Ambient Listening during the visit with  Moses Madrid MD for chart documentation. 8/27/2024  15:13 EDT    INSPECT REPORT    As part of the patient's treatment plan, I may be prescribing controlled substances. The patient has been made aware of appropriate use of such medications, including potential risk of somnolence, limited ability to drive and/or work safely, and the potential for dependence or overdose. It has also bee made clear that these  medications are for use by this patient only, without concomitant use of alcohol or other substances unless prescribed.     Patient has completed prescribing agreement detailing terms of continued prescribing of controlled substances, including monitoring JOHANA reports, urine drug screening, and pill counts if necessary. The patient is aware that inappropriate use will results in cessation of prescribing such medications.    INSPECT report has been reviewed and scanned into the patient's chart.    As the clinician, I personally reviewed the INSPECT from 8/27/2024.    History and physical exam exhibit continued safe and appropriate use of controlled substances.      EMR Dragon/Transcription disclaimer:   Much of this encounter note is an electronic transcription/translation of spoken language to printed text. The electronic translation of spoken language may permit erroneous, or at times, nonsensical words or phrases to be inadvertently transcribed; Although I have reviewed the note for such errors, some may still exist.

## 2024-08-28 ENCOUNTER — TELEPHONE (OUTPATIENT)
Dept: PAIN MEDICINE | Facility: HOSPITAL | Age: 69
End: 2024-08-28
Payer: MEDICARE

## 2024-08-28 NOTE — TELEPHONE ENCOUNTER
Pt called our office and wanted to know what the buprenorphine was for.  I tried to call pt to educate her on the med but her vm was not set up.

## 2024-09-09 ENCOUNTER — HOSPITAL ENCOUNTER (OUTPATIENT)
Dept: PAIN MEDICINE | Facility: HOSPITAL | Age: 69
Discharge: HOME | End: 2024-09-09
Payer: MEDICARE

## 2024-09-09 VITALS
BODY MASS INDEX: 21.66 KG/M2 | WEIGHT: 130 LBS | RESPIRATION RATE: 15 BRPM | HEIGHT: 65 IN | HEART RATE: 50 BPM | TEMPERATURE: 97.1 F | DIASTOLIC BLOOD PRESSURE: 73 MMHG | SYSTOLIC BLOOD PRESSURE: 183 MMHG | OXYGEN SATURATION: 95 %

## 2024-09-09 DIAGNOSIS — M43.10 SPONDYLOLISTHESIS, ACQUIRED: ICD-10-CM

## 2024-09-09 DIAGNOSIS — R52 PAIN: ICD-10-CM

## 2024-09-09 DIAGNOSIS — M51.36 DDD (DEGENERATIVE DISC DISEASE), LUMBAR: Primary | ICD-10-CM

## 2024-09-09 PROCEDURE — 77003 FLUOROGUIDE FOR SPINE INJECT: CPT

## 2024-09-09 PROCEDURE — 64635 DESTROY LUMB/SAC FACET JNT: CPT | Performed by: STUDENT IN AN ORGANIZED HEALTH CARE EDUCATION/TRAINING PROGRAM

## 2024-09-09 PROCEDURE — 99152 MOD SED SAME PHYS/QHP 5/>YRS: CPT | Performed by: STUDENT IN AN ORGANIZED HEALTH CARE EDUCATION/TRAINING PROGRAM

## 2024-09-09 PROCEDURE — 25010000002 FENTANYL CITRATE (PF) 50 MCG/ML SOLUTION

## 2024-09-09 PROCEDURE — 25010000002 METHYLPREDNISOLONE PER 40 MG: Performed by: STUDENT IN AN ORGANIZED HEALTH CARE EDUCATION/TRAINING PROGRAM

## 2024-09-09 PROCEDURE — 64636 DESTROY L/S FACET JNT ADDL: CPT | Performed by: STUDENT IN AN ORGANIZED HEALTH CARE EDUCATION/TRAINING PROGRAM

## 2024-09-09 RX ORDER — FENTANYL CITRATE 50 UG/ML
INJECTION, SOLUTION INTRAMUSCULAR; INTRAVENOUS
Status: COMPLETED
Start: 2024-09-09 | End: 2024-09-09

## 2024-09-09 RX ORDER — METHYLPREDNISOLONE ACETATE 40 MG/ML
40 INJECTION, SUSPENSION INTRA-ARTICULAR; INTRALESIONAL; INTRAMUSCULAR; SOFT TISSUE ONCE
Status: COMPLETED | OUTPATIENT
Start: 2024-09-09 | End: 2024-09-09

## 2024-09-09 RX ORDER — MIDAZOLAM HYDROCHLORIDE 1 MG/ML
INJECTION INTRAMUSCULAR; INTRAVENOUS
Status: DISCONTINUED
Start: 2024-09-09 | End: 2024-09-10 | Stop reason: HOSPADM

## 2024-09-09 RX ORDER — FENTANYL CITRATE 50 UG/ML
100 INJECTION, SOLUTION INTRAMUSCULAR; INTRAVENOUS
Status: DISCONTINUED | OUTPATIENT
Start: 2024-09-09 | End: 2024-09-10 | Stop reason: HOSPADM

## 2024-09-09 RX ORDER — MIDAZOLAM HYDROCHLORIDE 1 MG/ML
2 INJECTION INTRAMUSCULAR; INTRAVENOUS ONCE
Status: DISCONTINUED | OUTPATIENT
Start: 2024-09-09 | End: 2024-09-10 | Stop reason: HOSPADM

## 2024-09-09 RX ORDER — LIDOCAINE HYDROCHLORIDE 20 MG/ML
5 INJECTION, SOLUTION EPIDURAL; INFILTRATION; INTRACAUDAL; PERINEURAL ONCE
Status: COMPLETED | OUTPATIENT
Start: 2024-09-09 | End: 2024-09-09

## 2024-09-09 RX ADMIN — METHYLPREDNISOLONE ACETATE 40 MG: 40 INJECTION, SUSPENSION INTRA-ARTICULAR; INTRALESIONAL; INTRAMUSCULAR; INTRASYNOVIAL; SOFT TISSUE at 14:34

## 2024-09-09 RX ADMIN — FENTANYL CITRATE 100 MCG: 50 INJECTION, SOLUTION INTRAMUSCULAR; INTRAVENOUS at 14:16

## 2024-09-09 RX ADMIN — LIDOCAINE HYDROCHLORIDE 5 ML: 20 INJECTION, SOLUTION EPIDURAL; INFILTRATION; INTRACAUDAL; PERINEURAL at 14:24

## 2024-09-09 NOTE — DISCHARGE INSTRUCTIONS
Radiofrequency Lesioning, Care After    Refer to this sheet in the next few weeks. These instructions provide you with information about caring for yourself after your procedure. Your health care provider may also give you more specific instructions. Your treatment has been planned according to current medical practices, but problems sometimes occur. Call your health care provider if you have any problems or questions after your procedure.  What can I expect after the procedure?  After the procedure, it is common to have:  Pain from the burned nerve.  You may feel a burning sensation for up to 1-2 weeks after the procedure  Temporary numbness at the site  Your leg/legs may be weak or feel numb after the procedure until the numbing medication wears off.  When you are up and walking, have assistance to prevent falling.     Follow these instructions at home:  Take over-the-counter and prescription medicines only as told by your health care provider.  Return to your normal activities as told by your health care provider. Ask your health care provider what activities are safe for you.  Pay close attention to how you feel after the procedure. If you start to have pain, write down when it hurts and how it feels. This will help you and your health care provider to know if you need an additional treatment.  Check your needle insertion site every day for signs of infection. Watch for:  Redness, swelling, or pain.  Fluid, blood, or pus.  Keep all follow-up visits as told by your health care provider. This is important.  No driving for 24 hrs-make sure you have full sensation in your legs prior to driving.  Avoid using heat on the injection site for 24 hours. You may use ice intermittently if needed by placing a               towel between your skin and the ice bag and using the ice for 20 minutes 2-3 times a day.  Do not take baths, swim or use a hot tub for 24 hours.  Leave your band-aids on for 24 hours and keep them  dry.    Contact a health care provider if:  Your pain does not get better.  You have redness, swelling, or pain at the needle insertion site.  You have fluid, blood, or pus coming from the needle insertion site.  You have a fever over 101 degrees.  You have new numb.ness in your arm or leg after the procedure    Get help right away if:  You develop sudden, severe pain.  You develop numbness or tingling near the procedure site that does not go away.  This information is not intended to replace advice given to you by your health care provider. Make sure you discuss any questions you have with your health care provider.  Document Released: 08/16/2012 Document Revised: 05/25/2017 Document Reviewed: 01/25/2016  [employer] Interactive Patient Education © 2019 [employer]  Moderate Conscious Sedation, Adult, Care After    This sheet gives you information about how to care for yourself after your procedure. Your health care provider may also give you more specific instructions. If you have problems or questions, contact your health care provider.    What can I expect after the procedure?  After the procedure, it is common to have:  Sleepiness for several hours.  Impaired judgment for several hours.  Difficulty with balance.  Vomiting if you eat too soon.    Follow these instructions at home:  For the next 24 hours:         Rest.  Do not participate in activities where you could fall or become injured.  Do not drive or use machinery.  Do not drink alcohol.  Do not take sleeping pills or medicines that cause drowsiness.  Do not make important decisions or sign legal documents.  Do not take care of children on your own.    Eating and drinking    Follow the diet recommended by your health care provider.  Drink enough fluid to keep your urine pale yellow.  If you vomit:  Drink water, juice, or soup when you can drink without vomiting.  Make sure you have little or no nausea before eating solid foods.     General  instructions  Take over-the-counter and prescription medicines only as told by your health care provider.  Have a responsible adult stay with you for 24 hours. It is important to have someone help care for you until you are awake and alert.  Do not smoke.  Keep all follow-up visits as told by your health care provider. This is important.    Contact a health care provider if:  You are still sleepy or having trouble with balance after 24 hours.  You feel light-headed.  You keep feeling nauseous or you keep vomiting.  You develop a rash.  You have a fever.  You have redness or swelling around the IV site.    Get help right away if:  You have trouble breathing.  You have new-onset confusion at home.    Summary  After the procedure, it is common to feel sleepy, have impaired judgment, or feel nauseous if you eat too soon.  Rest after you get home. Know the things you should not do after the procedure.  Follow the diet recommended by your health care provider and drink enough fluid to keep your urine pale yellow.  Get help right away if you have trouble breathing or new-onset confusion at home.    This information is not intended to replace advice given to you by your health care provider. Make sure you discuss any questions you have with your health care provider.    Document Revised: 04/16/2021 Document Reviewed: 11/12/2020  Elsevier Patient Education © 2021 [employer]

## 2024-09-09 NOTE — PROCEDURES
Bilateral L3-5 Lumbar Medial Branch RADIOFREQUENCY  [facility]      PREOPERATIVE DIAGNOSIS:  Lumbar spondylosis without myelopathy    POSTOPERATIVE DIAGNOSIS:  Lumbar spondylosis without myelopathy    PROCEDURE:   Diagnostic Bilateral Lumbar Medial Branch Nerve thermal radiofrequency lesioning, with fluoroscopy:  L3, L4, and L5 nerves (at the L4 and L5 transverse processes and the sacral alar groove) to thermally treat the innervation to facet joints L4-5 and L5-S1  20862-28 -- Bilateral L/S facet neuro destr., 1st Level  14420-31 -- Bilateral L/S facet neuro destr., 2nd  Level    PRE-PROCEDURE DISCUSSION WITH PATIENT:    Risks and complications were discussed with the patient prior to starting the procedure and informed consent was obtained.      SURGEON:  Audi Madrid MD    REASON FOR PROCEDURE:    The patient complains of pain that seems to have a significant axial component, Previous clinically significant therapeutic effect after prior Radiofrequency procedure, Increased back pain on range of motion exams, Pain on extension of the lumbar spine, and Positive lumbar facet loading maneuver    SEDATION:  Fentanyl 100 mcg IV  TIME OF PROCEDURE:   The intraoperative procedure time after administration of the sedative was 22 minutes.     TO: 1416  Proc End: 1438    ANESTHETIC:  Lidocaine 2%  STEROID:  Methylprednisolone (DEPO MEDROL) 40mg/ml      DESCRIPTON OF PROCEDURE:  After obtaining informed consent, IV access  was obtained in the preoperative area.   The patient was taken to the operating room.  The patient was placed in the prone position with a pillow under the abdomen. All pressure points were well padded.  EKG, blood pressure, and pulse oximeter were monitored.  The patient was monitored and sedated by the RN under my direction. The lumbosacral area was prepped with Chloraprep and draped in a sterile fashion.     Under fluoroscopic guidance the transverse processes of the L4 and L5 vertebrae at  the junctions of the superior articular processes were identified on the right.  Also identified was the groove between the ala and the superior articular process of the sacrum on the ipsilateral side.  Skin and subcutaneous tissue were anesthetized with 1ml of 1% lidocaine above each of these points. Then, radiofrequency probe needles were advanced in this fluoro view to the above junctions.  Aspiration was negative for blood and CSF.  After confirming the position of the needle with fluoroscope in all views, testing was initiated.  Motor testing was confirmed to be negative at 3V and 2Hz for any radicular stimulation.  Then 1mL of the local anesthetic was instilled in each needle.  Two minutes elapsed, and during this time a lateral fluoroscopic view was confirmed again to ensure the needles had not advanced nor retracted.  Then, Radiofrequency Lesioning was initiated for 2.5 minutes at 80 degrees Celsius, rotating the needle tips 90 degrees 1/2 way through. Needles were removed intact from each of the areas.     A similar procedure was repeated to address the L3, L4, and L5 nerves on the contralateral side.   Onset of analgesia was noted.  Vital signs remained stable throughout.      ESTIMATED BLOOD LOSS:  <5 mL  SPECIMENS:  none    COMPLICATIONS:   No complications were noted.    TOLERANCE & DISCHARGE CONDITION:    The patient tolerated the procedure well.  The patient was transported to the recovery area without difficulties.  The patient was discharged to home under the care of family in stable and satisfactory condition.    PLAN OF CARE:  The patient was given our standard instruction sheet.  The patient will  Return to clinic 6 wks.  The patient will resume all medications as per the medication reconciliation sheet.

## 2024-09-10 ENCOUNTER — TELEPHONE (OUTPATIENT)
Dept: PAIN MEDICINE | Facility: HOSPITAL | Age: 69
End: 2024-09-10
Payer: MEDICARE

## 2024-09-10 NOTE — TELEPHONE ENCOUNTER
Attempted post procedure phone call but no answer. Voicemail not set up so unable to leave message.

## 2024-09-12 ENCOUNTER — OFFICE VISIT (OUTPATIENT)
Dept: PAIN MEDICINE | Facility: CLINIC | Age: 69
End: 2024-09-12
Payer: MEDICARE

## 2024-09-12 VITALS
OXYGEN SATURATION: 98 % | RESPIRATION RATE: 16 BRPM | HEART RATE: 62 BPM | DIASTOLIC BLOOD PRESSURE: 78 MMHG | SYSTOLIC BLOOD PRESSURE: 155 MMHG

## 2024-09-12 DIAGNOSIS — M51.36 DDD (DEGENERATIVE DISC DISEASE), LUMBAR: ICD-10-CM

## 2024-09-12 DIAGNOSIS — Z79.899 HIGH RISK MEDICATION USE: Primary | ICD-10-CM

## 2024-09-12 DIAGNOSIS — M43.10 SPONDYLOLISTHESIS, ACQUIRED: ICD-10-CM

## 2024-09-12 PROCEDURE — 1125F AMNT PAIN NOTED PAIN PRSNT: CPT | Performed by: STUDENT IN AN ORGANIZED HEALTH CARE EDUCATION/TRAINING PROGRAM

## 2024-09-12 PROCEDURE — 99024 POSTOP FOLLOW-UP VISIT: CPT | Performed by: STUDENT IN AN ORGANIZED HEALTH CARE EDUCATION/TRAINING PROGRAM

## 2024-09-12 PROCEDURE — 1159F MED LIST DOCD IN RCRD: CPT | Performed by: STUDENT IN AN ORGANIZED HEALTH CARE EDUCATION/TRAINING PROGRAM

## 2024-09-12 PROCEDURE — G0463 HOSPITAL OUTPT CLINIC VISIT: HCPCS | Performed by: STUDENT IN AN ORGANIZED HEALTH CARE EDUCATION/TRAINING PROGRAM

## 2024-09-12 PROCEDURE — 3077F SYST BP >= 140 MM HG: CPT | Performed by: STUDENT IN AN ORGANIZED HEALTH CARE EDUCATION/TRAINING PROGRAM

## 2024-09-12 PROCEDURE — 3078F DIAST BP <80 MM HG: CPT | Performed by: STUDENT IN AN ORGANIZED HEALTH CARE EDUCATION/TRAINING PROGRAM

## 2024-09-12 PROCEDURE — 1160F RVW MEDS BY RX/DR IN RCRD: CPT | Performed by: STUDENT IN AN ORGANIZED HEALTH CARE EDUCATION/TRAINING PROGRAM

## 2024-09-12 RX ORDER — OXYCODONE AND ACETAMINOPHEN 10; 325 MG/1; MG/1
1 TABLET ORAL 4 TIMES DAILY PRN
Qty: 120 TABLET | Refills: 0 | Status: SHIPPED | OUTPATIENT
Start: 2024-10-11

## 2024-09-12 RX ORDER — ONDANSETRON 4 MG/1
TABLET, FILM COATED ORAL
COMMUNITY
Start: 2024-09-02

## 2024-09-12 RX ORDER — OXYCODONE AND ACETAMINOPHEN 10; 325 MG/1; MG/1
1 TABLET ORAL 4 TIMES DAILY PRN
Qty: 120 TABLET | Refills: 0 | Status: SHIPPED | OUTPATIENT
Start: 2024-09-12

## 2024-09-12 NOTE — PROGRESS NOTES
CHIEF COMPLAINT  Chief Complaint   Patient presents with    Pain     RFA Follow-up  No Narcotics- LD Buprenorphine > 2 weeks, pt took once and didn't like the way it made her feel  New Pain- headaches       Primary Care  Janet Garcia APRN Subjective Charlotte M Duc is a 69 y.o. female  who presents for multiple pain complaints.    History of Present Illness  The patient presents for evaluation of multiple medical concerns. She is accompanied by her .    The patient underwent back surgery in 2015 and subsequently received injections from pain management in [facility] for back and sciatic pain radiating down to her ankle. Following a nerve ablation, she experienced a significant exacerbation of her pain, which took approximately 6 months to resolve. Despite undergoing physical therapy, her insurance has since ceased coverage. She has previously received multiple epidural injections. Currently, she experiences pain in her back, which radiates to her groin and down to her knee, accompanied by a tingling sensation that worsens as the day progresses. This tingling sensation is more concerning than the back pain. The pain, which makes her lean over, induces fatigue, impedes her ability to function by the end of the day. She is unable to maintain an upright posture. An MRI of her back was conducted in 06/2023 or 07/2023. She has previously received ozone injections in [place] to reduce swelling. She has purchased a neck traction pillow and performs neck stretch exercises, but is uncertain of their efficacy. She finds relief when wearing a back brace.    The patient has been experiencing chronic headaches since the spring, which have been causing her to feel depressed and cry constantly. A CT scan of her chest and sinuses was scheduled due to her headaches, both of which have been performed. She has a history of sinus surgery. She describes the pain as a vibration in her head, which radiates to her teeth and  the right side of her face, primarily on the right side. She occasionally experiences pain on the left side, but it is not as severe as the right side. She lived in [facility] for 2 years from 2016 to 2018, during which she wore a neck brace while driving due to bumpy roads. She used to sew and read, but had to discontinue these activities. She takes Lunesta, MiraLAX, 2 melatonin, and 1 muscle relaxant at night, which provides relief. She does not experience pain while sleeping. She was prescribed Flexeril by Ms. Garcia, which did not provide relief for her headaches. She takes 2 extra strength ibuprofen and 3 ibuprofen, which provides some relief, but the pain does not completely resolve. She takes this in the evening when her symptoms are most severe. She has tried butalbital and acetaminophen, which did not provide relief. She was prescribed tramadol by her primary care physician, which did not provide relief and caused stomach upset. She has also tried gabapentin and Neurontin for back flare-ups, but these have not improved her headaches. She reports soreness in her head, sensitivity, and soreness, and sensitivity to cluttering kitchen noises, doors banging, and driving over rough roads. She describes the pain as an electric shock. She has a history of jaw problems. Her hearing has improved, but she is unsure if there is a difference in the right ear versus the left. She experienced intermittent vertigo a week ago, which resolved after an hour. She has a history of shingles twice on the side of her face. She had implants and a sinus lift, which resulted in sensitivity on one side of her face. She has an upcoming appointment with an ENT specialist at the end of the month.    The patient reports feeling cold and sick half of the time, including nausea. She has lost approximately 10 pounds in the last 4 to 5 weeks. Her appetite is diminished, and she questions if this could be due to anxiety. She had a wellness check 2  to 3 months ago, which was normal. She has been on a slow dose of thyroid medication for years. She reports significant stomach stress and has started taking probiotics.    FAMILY HISTORY  Her mother  of cancer in her back. Her sister has migraines.      History of Present Illness     Location: Low back, leg, scalp  Onset: Years ago  Duration: Worsening  Timing: Constant throughout the day  Quality: Aching, sharp, stabbing, tingling  Severity: Today: 6       Last Week: 6       Worst: 8  Modifying Factors: The pain is worse throughout the day, and improves with sleep  Functional Deficit: The pain makes activities of daily living difficult    Physical Therapy: yes    Interval Update 2024: Still complaining of very severe back pain.  She is uncertain whether she getting significant benefit from her RFA thus far.  Also complaining of headaches today which she says is more resemble migraines.  She is somewhat better from her trigeminal neuralgia but otherwise continues to complain of essentially uncontrolled pain    The following portions of the patient's history were reviewed and updated as appropriate: allergies, current medications, past family history, past medical history, past social history, past surgical history, and problem list.    Procedures:        Current Outpatient Medications:     aspirin 81 MG EC tablet, Take 1 tablet by mouth Daily., Disp: 30 tablet, Rfl: 11    atorvastatin (Lipitor) 20 MG tablet, Take 1 tablet by mouth Daily., Disp: 30 tablet, Rfl: 11    carBAMazepine (TEGretol) 200 MG tablet, Take 1 tablet by mouth 2 (Two) Times a Day., Disp: 60 tablet, Rfl: 0    DULoxetine (CYMBALTA) 20 MG capsule, Take 1 capsule by mouth Daily., Disp: 30 capsule, Rfl: 0    eszopiclone (LUNESTA) 2 MG tablet, 1 tablet Every Night., Disp: , Rfl:     fluticasone (FLONASE) 50 MCG/ACT nasal spray, 2 sprays Daily., Disp: , Rfl:     levothyroxine (SYNTHROID, LEVOTHROID) 50 MCG tablet, Take 1 tablet by mouth Daily.,  Disp: , Rfl:     methocarbamol (ROBAXIN) 500 MG tablet, take 2 tablets by mouth at bedtime as needed, Disp: , Rfl:     metoprolol succinate XL (TOPROL-XL) 25 MG 24 hr tablet, Take 1 tablet by mouth Daily., Disp: , Rfl:     ondansetron (ZOFRAN) 4 MG tablet, take 1 tablet by mouth twice daily as needed for nausea, Disp: , Rfl:     potassium gluconate 595 (99 K) MG tablet tablet, POTASSIUM GLUCONATE 595 (99 K) MG TABS, Disp: , Rfl:     [START ON 10/11/2024] oxyCODONE-acetaminophen (PERCOCET)  MG per tablet, Take 1 tablet by mouth 4 (Four) Times a Day As Needed for Severe Pain., Disp: 120 tablet, Rfl: 0    oxyCODONE-acetaminophen (PERCOCET)  MG per tablet, Take 1 tablet by mouth 4 (Four) Times a Day As Needed for Severe Pain., Disp: 120 tablet, Rfl: 0    Review of Systems   HENT:  Positive for congestion, ear pain, hearing loss, sinus pressure and sinus pain.    Musculoskeletal:  Positive for arthralgias, back pain, gait problem, myalgias, neck pain and neck stiffness. Negative for joint swelling.   Neurological:  Positive for weakness, numbness and headaches.       Vitals:    09/12/24 1403   BP: 155/78   Pulse: 62   Resp: 16   SpO2: 98%   PainSc:   7       Urine Drug Screen: 9/12/2024  Appropriate: Pending    Objective   Physical Exam  Vitals and nursing note reviewed. Exam conducted with a chaperone present.   Constitutional:       Appearance: Normal appearance. She is well-developed and normal weight.   HENT:      Head: Normocephalic.      Jaw: Tenderness and pain on movement present.      Comments: Significant scalp tenderness  Neck:      Trachea: No tracheal deviation.   Musculoskeletal:      Cervical back: Normal range of motion and neck supple. No tenderness.      Comments: Lumbar Spine Exam:  Tender to palpation over the lumbar paraspinal musculature Yes  Limited range of motion secondary to pain Yes  Facet loading positive: bilateral  Facets tender to palpation: bilateral  Straight leg raise test  positive: Equivocal       Neurological:      Mental Status: She is alert.      Sensory: No sensory deficit.      Motor: No weakness.         Assessment & Plan   Problems Addressed this Visit       Spondylolisthesis, acquired    Relevant Medications    oxyCODONE-acetaminophen (PERCOCET)  MG per tablet (Start on 10/11/2024)    oxyCODONE-acetaminophen (PERCOCET)  MG per tablet    DDD (degenerative disc disease), lumbar - Primary    Relevant Medications    oxyCODONE-acetaminophen (PERCOCET)  MG per tablet (Start on 10/11/2024)    oxyCODONE-acetaminophen (PERCOCET)  MG per tablet     Diagnoses         Codes Comments    DDD (degenerative disc disease), lumbar    -  Primary ICD-10-CM: M51.36  ICD-9-CM: 722.52     Spondylolisthesis, acquired     ICD-10-CM: M43.10  ICD-9-CM: 738.4             Plan:  Recently underwent bilateral L4-5 and L5-S1 RFA.  Unclear benefit thus far  Continue Tegretol and Cymbalta  Discontinue Lyrica as I am not sure that is providing any benefit  Discontinue buprenorphine as she reports she did not like the way it made her feel  Can restart her on Percocet 10 mg 4 times daily.  She reports that she previously tolerated this dose is wanting to restart Percocet  UDS today.  Inspect appropriate    --- Follow-up 2 months         Patient or patient representative verbalized consent for the use of Ambient Listening during the visit with  Moses Madrid MD for chart documentation. 9/12/2024  15:13 EDT    INSPECT REPORT    As part of the patient's treatment plan, I may be prescribing controlled substances. The patient has been made aware of appropriate use of such medications, including potential risk of somnolence, limited ability to drive and/or work safely, and the potential for dependence or overdose. It has also bee made clear that these medications are for use by this patient only, without concomitant use of alcohol or other substances unless prescribed.     Patient has  completed prescribing agreement detailing terms of continued prescribing of controlled substances, including monitoring JOHANA reports, urine drug screening, and pill counts if necessary. The patient is aware that inappropriate use will results in cessation of prescribing such medications.    INSPECT report has been reviewed and scanned into the patient's chart.    As the clinician, I personally reviewed the INSPECT from 9/10/2024.    History and physical exam exhibit continued safe and appropriate use of controlled substances.      EMR Dragon/Transcription disclaimer:   Much of this encounter note is an electronic transcription/translation of spoken language to printed text. The electronic translation of spoken language may permit erroneous, or at times, nonsensical words or phrases to be inadvertently transcribed; Although I have reviewed the note for such errors, some may still exist.

## 2024-09-17 RX ORDER — CARBAMAZEPINE 200 MG/1
200 TABLET ORAL 2 TIMES DAILY
Qty: 60 TABLET | Refills: 0 | Status: SHIPPED | OUTPATIENT
Start: 2024-09-17

## 2024-09-30 RX ORDER — CARBAMAZEPINE 200 MG/1
200 TABLET ORAL 2 TIMES DAILY
Qty: 60 TABLET | Refills: 2 | Status: SHIPPED | OUTPATIENT
Start: 2024-09-30

## 2024-09-30 RX ORDER — DULOXETIN HYDROCHLORIDE 20 MG/1
20 CAPSULE, DELAYED RELEASE ORAL DAILY
Qty: 30 CAPSULE | Refills: 2 | Status: SHIPPED | OUTPATIENT
Start: 2024-09-30

## 2024-09-30 NOTE — TELEPHONE ENCOUNTER
Caller: CRYSTAL    Relationship: SELF    Best call back number: [phone]    Requested Prescriptions:   Requested Prescriptions     Pending Prescriptions Disp Refills    DULoxetine (CYMBALTA) 20 MG capsule 30 capsule 0     Sig: Take 1 capsule by mouth Daily.    carBAMazepine (TEGretol) 200 MG tablet 60 tablet 0     Sig: Take 1 tablet by mouth 2 (Two) Times a Day.        Pharmacy where request should be sent:    [facility] [address] [place] [address]   Last office visit with prescribing clinician: 9/12/2024   Last telemedicine visit with prescribing clinician: Visit date not found   Next office visit with prescribing clinician: 11/8/2024     Additional details provided by patient: SHE IS LEAVING FOR 3WK TRIP TO [place] ON 10/5/24- WILL NOT BE BACK UNTIL 10/25/24    Does the patient have less than a 3 day supply:  [x] Yes  [] No    Would you like a call back once the refill request has been completed: [x] Yes [] No    If the office needs to give you a call back, can they leave a voicemail: [] Yes [x] No    Daron Mendoza Rep   09/30/24 09:40 EDT

## 2024-11-04 ENCOUNTER — TELEPHONE (OUTPATIENT)
Dept: PAIN MEDICINE | Facility: CLINIC | Age: 69
End: 2024-11-04
Payer: MEDICARE

## 2024-11-04 NOTE — TELEPHONE ENCOUNTER
Caller: Adelina Xavier    Relationship: Self    Best call back number: [phone]    What is the medical concern/diagnosis: HEADACHES, NEURALGIA    What specialty or service is being requested: NEUROLOGY    What is the provider, practice or medical service name: [facility] WOULD RECOMMEND    Any additional details:  PT STATES HER HEADACHES ARE GETTING WORSE AND WOULD LIKE TO MOVE FORWARD WITH A REFERRAL TO NEUROLOGY. PLEASE CONTACT PT ONCE REFERRAL HAS BEEN SENT.

## 2024-11-05 DIAGNOSIS — R51.9 FREQUENT HEADACHES: Primary | ICD-10-CM

## 2024-11-08 ENCOUNTER — TELEPHONE (OUTPATIENT)
Dept: PAIN MEDICINE | Facility: HOSPITAL | Age: 69
End: 2024-11-08
Payer: MEDICARE

## 2024-11-08 ENCOUNTER — OFFICE VISIT (OUTPATIENT)
Dept: PAIN MEDICINE | Facility: CLINIC | Age: 69
End: 2024-11-08
Payer: MEDICARE

## 2024-11-08 ENCOUNTER — TELEPHONE (OUTPATIENT)
Dept: PAIN MEDICINE | Facility: CLINIC | Age: 69
End: 2024-11-08
Payer: MEDICARE

## 2024-11-08 VITALS
OXYGEN SATURATION: 98 % | RESPIRATION RATE: 16 BRPM | SYSTOLIC BLOOD PRESSURE: 152 MMHG | DIASTOLIC BLOOD PRESSURE: 84 MMHG | HEART RATE: 57 BPM

## 2024-11-08 DIAGNOSIS — M43.10 SPONDYLOLISTHESIS, ACQUIRED: ICD-10-CM

## 2024-11-08 DIAGNOSIS — R51.9 FREQUENT HEADACHES: Primary | ICD-10-CM

## 2024-11-08 PROCEDURE — G0463 HOSPITAL OUTPT CLINIC VISIT: HCPCS | Performed by: STUDENT IN AN ORGANIZED HEALTH CARE EDUCATION/TRAINING PROGRAM

## 2024-11-08 RX ORDER — AMLODIPINE BESYLATE 5 MG/1
1 TABLET ORAL DAILY
COMMUNITY
Start: 2024-10-14

## 2024-11-08 RX ORDER — DULOXETINE 40 MG/1
40 CAPSULE, DELAYED RELEASE ORAL DAILY
Qty: 30 CAPSULE | Refills: 0 | Status: SHIPPED | OUTPATIENT
Start: 2024-11-08 | End: 2024-11-08

## 2024-11-08 RX ORDER — DULOXETIN HYDROCHLORIDE 30 MG/1
30 CAPSULE, DELAYED RELEASE ORAL DAILY
Qty: 30 CAPSULE | Refills: 2 | Status: SHIPPED | OUTPATIENT
Start: 2024-11-08

## 2024-11-08 NOTE — PROGRESS NOTES
CHIEF COMPLAINT  Chief Complaint   Patient presents with    Back Pain     Oxycodone  pt is not taking        Primary Care  Janet Garcia APRN Subjective Charkeila Xavier is a 69 y.o. female  who presents for multiple pain complaints.    History of Present Illness  The patient presents for evaluation of multiple medical concerns. She is accompanied by her .    The patient underwent back surgery in 2015 and subsequently received injections from pain management in [facility] for back and sciatic pain radiating down to her ankle. Following a nerve ablation, she experienced a significant exacerbation of her pain, which took approximately 6 months to resolve. Despite undergoing physical therapy, her insurance has since ceased coverage. She has previously received multiple epidural injections. Currently, she experiences pain in her back, which radiates to her groin and down to her knee, accompanied by a tingling sensation that worsens as the day progresses. This tingling sensation is more concerning than the back pain. The pain, which makes her lean over, induces fatigue, impedes her ability to function by the end of the day. She is unable to maintain an upright posture. An MRI of her back was conducted in 06/2023 or 07/2023. She has previously received ozone injections in [place] to reduce swelling. She has purchased a neck traction pillow and performs neck stretch exercises, but is uncertain of their efficacy. She finds relief when wearing a back brace.    The patient has been experiencing chronic headaches since the spring, which have been causing her to feel depressed and cry constantly. A CT scan of her chest and sinuses was scheduled due to her headaches, both of which have been performed. She has a history of sinus surgery. She describes the pain as a vibration in her head, which radiates to her teeth and the right side of her face, primarily on the right side. She occasionally experiences pain on the  left side, but it is not as severe as the right side. She lived in [facility] for 2 years from 2016 to 2018, during which she wore a neck brace while driving due to bumpy roads. She used to sew and read, but had to discontinue these activities. She takes Lunesta, MiraLAX, 2 melatonin, and 1 muscle relaxant at night, which provides relief. She does not experience pain while sleeping. She was prescribed Flexeril by Ms. Garcia, which did not provide relief for her headaches. She takes 2 extra strength ibuprofen and 3 ibuprofen, which provides some relief, but the pain does not completely resolve. She takes this in the evening when her symptoms are most severe. She has tried butalbital and acetaminophen, which did not provide relief. She was prescribed tramadol by her primary care physician, which did not provide relief and caused stomach upset. She has also tried gabapentin and Neurontin for back flare-ups, but these have not improved her headaches. She reports soreness in her head, sensitivity, and soreness, and sensitivity to cluttering kitchen noises, doors banging, and driving over rough roads. She describes the pain as an electric shock. She has a history of jaw problems. Her hearing has improved, but she is unsure if there is a difference in the right ear versus the left. She experienced intermittent vertigo a week ago, which resolved after an hour. She has a history of shingles twice on the side of her face. She had implants and a sinus lift, which resulted in sensitivity on one side of her face. She has an upcoming appointment with an ENT specialist at the end of the month.    The patient reports feeling cold and sick half of the time, including nausea. She has lost approximately 10 pounds in the last 4 to 5 weeks. Her appetite is diminished, and she questions if this could be due to anxiety. She had a wellness check 2 to 3 months ago, which was normal. She has been on a slow dose of thyroid medication for years.  She reports significant stomach stress and has started taking probiotics.    FAMILY HISTORY  Her mother  of cancer in her back. Her sister has migraines.      History of Present Illness     Location: Low back, leg, scalp  Onset: Years ago  Duration: Worsening  Timing: Constant throughout the day  Quality: Aching, sharp, stabbing, tingling  Severity: Today: 6       Last Week: 6       Worst: 8  Modifying Factors: The pain is worse throughout the day, and improves with sleep  Functional Deficit: The pain makes activities of daily living difficult    Physical Therapy: yes    Interval Update 2024: She recently underwent decompression and fusion in [place] and reports near complete resolution of her low back pain.  Continues with significant headaches    The following portions of the patient's history were reviewed and updated as appropriate: allergies, current medications, past family history, past medical history, past social history, past surgical history, and problem list.    Procedures:        Current Outpatient Medications:     amLODIPine (NORVASC) 5 MG tablet, Take 1 tablet by mouth Daily., Disp: , Rfl:     aspirin 81 MG EC tablet, Take 1 tablet by mouth Daily., Disp: 30 tablet, Rfl: 11    atorvastatin (Lipitor) 20 MG tablet, Take 1 tablet by mouth Daily., Disp: 30 tablet, Rfl: 11    carBAMazepine (TEGretol) 200 MG tablet, Take 1 tablet by mouth 2 (Two) Times a Day., Disp: 60 tablet, Rfl: 2    DULoxetine 40 MG capsule delayed-release particles, Take 1 capsule by mouth Daily., Disp: 30 capsule, Rfl: 0    eszopiclone (LUNESTA) 2 MG tablet, 1 tablet Every Night., Disp: , Rfl:     fluticasone (FLONASE) 50 MCG/ACT nasal spray, 2 sprays Daily., Disp: , Rfl:     levothyroxine (SYNTHROID, LEVOTHROID) 50 MCG tablet, Take 1 tablet by mouth Daily., Disp: , Rfl:     methocarbamol (ROBAXIN) 500 MG tablet, take 2 tablets by mouth at bedtime as needed, Disp: , Rfl:     metoprolol succinate XL (TOPROL-XL) 25 MG 24 hr  tablet, Take 1 tablet by mouth Daily., Disp: , Rfl:     ondansetron (ZOFRAN) 4 MG tablet, take 1 tablet by mouth twice daily as needed for nausea, Disp: , Rfl:     potassium gluconate 595 (99 K) MG tablet tablet, POTASSIUM GLUCONATE 595 (99 K) MG TABS, Disp: , Rfl:     oxyCODONE-acetaminophen (PERCOCET)  MG per tablet, Take 1 tablet by mouth 4 (Four) Times a Day As Needed for Severe Pain. (Patient not taking: Reported on 11/8/2024), Disp: 120 tablet, Rfl: 0    oxyCODONE-acetaminophen (PERCOCET)  MG per tablet, Take 1 tablet by mouth 4 (Four) Times a Day As Needed for Severe Pain. (Patient not taking: Reported on 11/8/2024), Disp: 120 tablet, Rfl: 0    Review of Systems   HENT:  Positive for congestion, ear pain, hearing loss, sinus pressure and sinus pain.    Musculoskeletal:  Positive for arthralgias, back pain, gait problem, myalgias, neck pain and neck stiffness. Negative for joint swelling.   Neurological:  Positive for weakness, numbness and headaches.       Vitals:    11/08/24 1428   BP: 152/84   Pulse: 57   Resp: 16   SpO2: 98%   PainSc:   1       Urine Drug Screen: 9/12/2024  Appropriate: Pending    Objective   Physical Exam  Vitals and nursing note reviewed. Exam conducted with a chaperone present.   Constitutional:       Appearance: Normal appearance. She is well-developed and normal weight.   HENT:      Head: Normocephalic.      Jaw: Tenderness and pain on movement present.      Comments: Significant scalp tenderness  Neck:      Trachea: No tracheal deviation.   Musculoskeletal:      Cervical back: Normal range of motion and neck supple. No tenderness.      Comments: Lumbar Spine Exam:  Tender to palpation over the lumbar paraspinal musculature Yes  Limited range of motion secondary to pain Yes  Facet loading positive: bilateral  Facets tender to palpation: bilateral  Straight leg raise test positive: Equivocal       Neurological:      Mental Status: She is alert.      Sensory: No sensory  deficit.      Motor: No weakness.         Assessment & Plan   Problems Addressed this Visit       Spondylolisthesis, acquired     Other Visit Diagnoses       Frequent headaches    -  Primary          Diagnoses         Codes Comments    Frequent headaches    -  Primary ICD-10-CM: R51.9  ICD-9-CM: 784.0     Spondylolisthesis, acquired     ICD-10-CM: M43.10  ICD-9-CM: 738.4             Plan:  Reports low back pain is essentially resolved after decompression and fusion  Continue Tegretol.  Increase Suboxone to 40 mg daily as she does report some continued depression  She is pending evaluation by the neurosurgery nurse practitioner for headaches      --- Follow-up 1 month         Patient or patient representative verbalized consent for the use of Ambient Listening during the visit with  Moses Madrid MD for chart documentation. 11/8/2024  15:13 EDT    INSPECT REPORT    As part of the patient's treatment plan, I may be prescribing controlled substances. The patient has been made aware of appropriate use of such medications, including potential risk of somnolence, limited ability to drive and/or work safely, and the potential for dependence or overdose. It has also bee made clear that these medications are for use by this patient only, without concomitant use of alcohol or other substances unless prescribed.     Patient has completed prescribing agreement detailing terms of continued prescribing of controlled substances, including monitoring JOHANA reports, urine drug screening, and pill counts if necessary. The patient is aware that inappropriate use will results in cessation of prescribing such medications.    INSPECT report has been reviewed and scanned into the patient's chart.    As the clinician, I personally reviewed the INSPECT from 11/6/2024.    History and physical exam exhibit continued safe and appropriate use of controlled substances.      EMR Dragon/Transcription disclaimer:   Much of this encounter note  is an electronic transcription/translation of spoken language to printed text. The electronic translation of spoken language may permit erroneous, or at times, nonsensical words or phrases to be inadvertently transcribed; Although I have reviewed the note for such errors, some may still exist.

## 2025-02-07 ENCOUNTER — TELEPHONE (OUTPATIENT)
Dept: PAIN MEDICINE | Facility: CLINIC | Age: 70
End: 2025-02-07
Payer: MEDICARE

## 2025-02-07 NOTE — TELEPHONE ENCOUNTER
Caller: Adelina Xavier    Relationship: Self    Best call back number:     Requested Prescriptions:   DULoxetine (CYMBALTA) 30 MG capsule     Pharmacy where request should be sent:    [facility] [address] [place] [address] [place] - [address] [facility] - [phone]  - [phone]   [address] [facility] [place] [address]  Phone: [phone]  Fax: [phone]       Last office visit with prescribing clinician: 11/8/2024   Last telemedicine visit with prescribing clinician: Visit date not found   Next office visit with prescribing clinician: Visit date not found     Additional details provided by patient: PATIENT HAS A 2 DAY SUPPLY     Does the patient have less than a 3 day supply:  [x] Yes  [] No    Would you like a call back once the refill request has been completed: [] Yes [] No    If the office needs to give you a call back, can they leave a voicemail: [] Yes [] No    Daron Thakur Rep   02/07/25 11:12 EST

## 2025-02-10 ENCOUNTER — TELEPHONE (OUTPATIENT)
Dept: PAIN MEDICINE | Facility: CLINIC | Age: 70
End: 2025-02-10
Payer: MEDICARE

## 2025-03-12 NOTE — PROGRESS NOTES
Chief Complaint  NEW PATIENT (HEADACHES)    Subjective            Adelina Xavier presents to [facility] for Frequent headaches   History of Present Illness      Adelina Xavier is a 69-year-old female seen today as a new patient referred by Dr. Madrid for headache and facial pain.  The patient states she had headaches for many years and was taking amitriptyline to help with headache and sleep.  When she was in [facility] several years ago she experienced rough roads  which caused neck and tooth issues.  She saw dental specialist at that time and was told to stop amitriptyline.  Since this she has had chronic face and head pain right.  She has seen multiple providers especially over the last year.  She is neurosurgery and pain management.  The patient underwent lumbar spine surgery in [place] last year and she states that since this, her pain even her head has improved.  She is currently taking duloxetine 30 mg daily and carbamazepine 200 mg twice a day for presumed trigeminal neuralgia.    Patient describes the pain as throbbing and electrical shock like in her teeth.  She has dull aching that goes into her ear.  She also reports having light and sound sensitivity with her headaches and occasional nausea.  She has been seeing a chiropractor who is adjusting her atlas.    The patient also mentions of having issues with her balance which she wanted to address today.            History reviewed. No pertinent family history.    Past Medical History:   Diagnosis Date    Allergic     Arthritis     Cataract     Chronic constipation     Hypertension     Injury of back     Lumbar foraminal stenosis        Social History     Socioeconomic History    Marital status:    Tobacco Use    Smoking status: Never     Passive exposure: Never    Smokeless tobacco: Never   Vaping Use    Vaping status: Never Used   Substance and Sexual Activity    Alcohol use: Not Currently    Drug use: Not Currently     Sexual activity: Defer         Current Outpatient Medications:     albuterol sulfate  (90 Base) MCG/ACT inhaler, INHALE 2 PUFFS BY MOUTH TWICE DAILY AS NEEDED, Disp: , Rfl:     alendronate (FOSAMAX) 70 MG tablet, Take 1 tablet by mouth 1 (One) Time Per Week., Disp: , Rfl:     amLODIPine (NORVASC) 5 MG tablet, Take 1 tablet by mouth Daily., Disp: , Rfl:     aspirin 81 MG EC tablet, Take 1 tablet by mouth Daily., Disp: 30 tablet, Rfl: 11    atorvastatin (Lipitor) 20 MG tablet, Take 1 tablet by mouth Daily., Disp: 30 tablet, Rfl: 11    carBAMazepine (TEGretol) 200 MG tablet, Take 1 tablet by mouth 2 (Two) Times a Day., Disp: 60 tablet, Rfl: 2    DULoxetine (CYMBALTA) 30 MG capsule, Take 1 capsule by mouth Daily., Disp: 30 capsule, Rfl: 2    eszopiclone (LUNESTA) 3 MG tablet, Take 1 tablet by mouth At Night As Needed., Disp: , Rfl:     fluticasone (FLONASE) 50 MCG/ACT nasal spray, 2 sprays Daily., Disp: , Rfl:     levothyroxine (SYNTHROID, LEVOTHROID) 50 MCG tablet, Take 1 tablet by mouth Daily., Disp: , Rfl:     methocarbamol (ROBAXIN) 500 MG tablet, take 2 tablets by mouth at bedtime as needed, Disp: , Rfl:     metoprolol succinate XL (TOPROL-XL) 50 MG 24 hr tablet, Take 1 tablet by mouth Daily., Disp: , Rfl:     ondansetron (ZOFRAN) 4 MG tablet, take 1 tablet by mouth twice daily as needed for nausea, Disp: , Rfl:     potassium gluconate 595 (99 K) MG tablet tablet, POTASSIUM GLUCONATE 595 (99 K) MG TABS, Disp: , Rfl:     amitriptyline (ELAVIL) 10 MG tablet, Take 1 tablet by mouth Every Night for 7 days, THEN 2 tablets Every Night., Disp: 60 tablet, Rfl: 5    Review of Systems   Constitutional:  Positive for fatigue.   HENT:  Positive for postnasal drip.    Gastrointestinal:  Positive for constipation and nausea.   Musculoskeletal:  Positive for neck pain and neck stiffness.   Neurological:  Positive for dizziness and headaches.   All other systems reviewed and are negative.           Objective   Vital  "Signs:   /74   Pulse 53   Ht 165.1 cm (65\")   Wt 57.2 kg (126 lb)   BMI 20.97 kg/m²     Physical Exam  Vitals reviewed.   Constitutional:       Appearance: She is well-developed.   HENT:      Head: Normocephalic and atraumatic.   Eyes:      General: Lids are normal.      Extraocular Movements: Extraocular movements intact.      Pupils: Pupils are equal, round, and reactive to light.   Neck:      Vascular: No carotid bruit.   Cardiovascular:      Rate and Rhythm: Normal rate.      Heart sounds: No murmur heard.  Pulmonary:      Effort: Pulmonary effort is normal.   Musculoskeletal:      Cervical back: Normal range of motion and neck supple.   Skin:     General: Skin is warm and dry.   Neurological:      Mental Status: She is alert and oriented to person, place, and time.      Cranial Nerves: No cranial nerve deficit.      Sensory: No sensory deficit.      Motor: Motor function is intact. No weakness or tremor.      Coordination: Romberg sign positive. Finger-Nose-Finger Test normal. Rapid alternating movements normal.      Gait: Tandem walk abnormal. Gait normal.      Deep Tendon Reflexes:      Reflex Scores:       Tricep reflexes are 2+ on the right side and 2+ on the left side.       Bicep reflexes are 2+ on the right side and 2+ on the left side.       Brachioradialis reflexes are 2+ on the right side and 2+ on the left side.       Patellar reflexes are 2+ on the right side and 2+ on the left side.       Achilles reflexes are 2+ on the right side and 2+ on the left side.  Psychiatric:         Attention and Perception: Attention normal.         Mood and Affect: Mood normal.         Speech: Speech normal.         Behavior: Behavior normal.         Cognition and Memory: Cognition and memory normal.         Judgment: Judgment normal.        Result Review :   The following data was reviewed by: FRANCISCO Carroll on 03/14/2025:                       MRI BRAIN W WO CONTRAST (images reviewed directly on " [facility] radiology portal)   Date of Exam: 8/22/2024 9:48 AM EDT   Indication: headaches, facial droop.  Impression:  Brain atrophy with extensive chronic microvascular ischemic changes.   No evidence of acute intracranial abnormality     Electronically Signed: Reza Mcdonald MD    8/22/2024 10:20 AM EDT     Neurological Exam  Mental Status  Alert. Oriented to person, place and time. Oriented to person, place, and time. Memory is normal. Speech is normal. Language is fluent with no aphasia. Attention and concentration are normal. Fund of knowledge is appropriate for level of education.  Patient has some word finding difficulty noted during visit today..    Cranial Nerves  CN II: Visual acuity is normal. Visual fields full to confrontation.  CN III, IV, VI: Extraocular movements intact bilaterally. Normal lids and orbits bilaterally. Pupils equal round and reactive to light bilaterally.  CN V:  Left: Diminished sensation of the entire left side of the face. Very slight light touch sensation decreased in the left forehead.  CN VII: Full and symmetric facial movement.  CN IX, X: Palate elevates symmetrically. Normal gag reflex.  CN XI: Shoulder shrug strength is normal.  CN XII: Tongue midline without atrophy or fasciculations.    Motor   Normal muscle tone. No abnormal involuntary movements. No pronator drift.                                             Right                     Left   Shoulder abduction               5                          5  Elbow flexion                         5                          5  Elbow extension                    5                          5  Wrist flexion                           5                          5  Wrist extension                      5                          5  Hip flexion                              5                          5  Knee flexion                           5                          5  Knee extension                      5                           5  Dorsiflexion                            5                          5                                             Right                     Left   Interossei                              5                          5   Abductor pollicis brevis         5                          5  Abductor digiti minimi           5                          5    Sensory  Light touch is normal in upper and lower extremities. Temperature is normal in upper and lower extremities. Vibration is normal in upper and lower extremities.     Reflexes                                            Right                      Left  Brachioradialis                    2+                         2+  Biceps                                 2+                         2+  Triceps                                2+                         2+  Patellar                                2+                         2+  Achilles                                2+                         2+    Coordination  No tremorRight: Finger-to-nose normal. Rapid alternating movement normal.Left: Finger-to-nose normal. Rapid alternating movement normal.    Gait   Normal gait.Casual gait: Hesitant gait. Tandem gait abnormality: Romberg is present. Able to rise from chair without using arms. Abnormal Tandem Gait Test.              Assessment and Plan    Diagnoses and all orders for this visit:    1. Migraine without aura and without status migrainosus, not intractable (Primary)  -     amitriptyline (ELAVIL) 10 MG tablet; Take 1 tablet by mouth Every Night for 7 days, THEN 2 tablets Every Night.  Dispense: 60 tablet; Refill: 5    2. Balance disorder  -     Ambulatory Referral to Physical Therapy for Evaluation & Treatment      Adelina Xavier is seen today as a new patient for headache and facial pain on the right.  She has had headaches for many years and was previously taking amitriptyline.  This was stopped several years ago when she needed to have  a dental procedure.  Around  the same time she had issues with her neck due to rough roads in [facility].  Last year she saw neurosurgery and pain management.  The patient ultimately underwent lumbar spine surgery in October 2024 in [place].  She states since that everything has improved including her headache and face pain.    Migraine with aura and/or trigeminal neuralgia    I would like for her to start amitriptyline 10 mg nightly x 1 week, then increase to 20 mg p.o. nightly.  I discussed the risk of serotonin syndrome when taken in conjunction with duloxetine.  Patient is aware.    Continue duloxetine and carbamazepine.    She complains about her balance.  She had swaying with Romberg today, but no weakness and no sensory deficits.  Reflexes 2+ throughout.  Will refer her for physical therapy eval and treat for balance.      We discussed possibly obtaining HST.  The patient feels like she has sleep apnea and underwent a sleep study in the past, but never received the results of the testing.  Discussed this again at next visit if it remains an issue.    Follow-up 3 months        I spent 69 minutes caring for Adelina on this date of service. This time includes time spent by me in the following activities:preparing for the visit, reviewing tests, performing a medically appropriate examination and/or evaluation , counseling and educating the patient/family/caregiver, ordering medications, tests, or procedures, referring and communicating with other health care professionals , and documenting information in the medical record  Follow Up   Return in about 3 months (around 6/14/2025).  Patient was given instructions and counseling regarding her condition or for health maintenance advice. Please see specific information pulled into the AVS if appropriate.         This document has been electronically signed by FRANCISCO Kamara on March 14, 2025 13:27 EDT

## 2025-03-14 ENCOUNTER — OFFICE VISIT (OUTPATIENT)
Dept: NEUROLOGY | Facility: CLINIC | Age: 70
End: 2025-03-14
Payer: MEDICARE

## 2025-03-14 VITALS
HEART RATE: 53 BPM | SYSTOLIC BLOOD PRESSURE: 160 MMHG | DIASTOLIC BLOOD PRESSURE: 74 MMHG | HEIGHT: 65 IN | WEIGHT: 126 LBS | BODY MASS INDEX: 20.99 KG/M2

## 2025-03-14 DIAGNOSIS — R26.89 BALANCE DISORDER: ICD-10-CM

## 2025-03-14 DIAGNOSIS — G43.009 MIGRAINE WITHOUT AURA AND WITHOUT STATUS MIGRAINOSUS, NOT INTRACTABLE: Primary | ICD-10-CM

## 2025-03-14 DIAGNOSIS — G50.9 IDIOPATHIC TRIGEMINAL NEUROPATHY: ICD-10-CM

## 2025-03-14 RX ORDER — ALBUTEROL SULFATE 90 UG/1
INHALANT RESPIRATORY (INHALATION)
COMMUNITY
Start: 2025-02-06

## 2025-03-14 RX ORDER — ALENDRONATE SODIUM 70 MG/1
1 TABLET ORAL WEEKLY
COMMUNITY
Start: 2024-12-10

## 2025-03-14 RX ORDER — AMITRIPTYLINE HYDROCHLORIDE 10 MG/1
TABLET ORAL
Qty: 60 TABLET | Refills: 5 | Status: SHIPPED | OUTPATIENT
Start: 2025-03-14 | End: 2026-03-21

## 2025-03-14 RX ORDER — METOPROLOL SUCCINATE 50 MG/1
1 TABLET, EXTENDED RELEASE ORAL DAILY
COMMUNITY
Start: 2024-12-02

## 2025-03-14 RX ORDER — ESZOPICLONE 3 MG/1
3 TABLET, FILM COATED ORAL NIGHTLY PRN
COMMUNITY
Start: 2025-03-08

## 2025-07-07 NOTE — PROGRESS NOTES
Subjective   History of Present Illness: Adelina Xavier is a 70 y.o. female is here today for follow-up.  Patient had been seen and followed in the past by myself about a year ago for neck and back pain and left leg pain.  Patient does carry a prior lumbar fusion history in 2015 L4-L5 prior injection therapy in 2021.  Patient continued with some back and leg pain and did follow-up with me with new imaging suggestive of adjacent segment disease changes with fairly significant canal stenosis at L2-L3 and L3-L4.  She was recommended to engage in injection therapy and follow-up with Dr. Rae if she failed injections.  Patient did obtain the injection but subsequently underwent extension of fusion in [place] in October 2024.  She says for 6 to 8 weeks she had great relief of her back and leg pain.  She still has some back soreness and achiness but her main complaints today are in her right leg posterior laterally that started in March after sitting for 3 days in a conference.  Her pain is significantly worse when she stands.  She has not undergone any conservative management or followed up with prior surgeon in [place] after her surgery.    History of Present Illness    The following portions of the patient's history were reviewed and updated as appropriate: allergies, current medications, past family history, past medical history, past social history, past surgical history, and problem list.    Review of Systems   Constitutional:  Positive for activity change.   HENT: Negative.     Eyes: Negative.    Respiratory: Negative.     Cardiovascular: Negative.    Gastrointestinal: Negative.    Endocrine: Negative.    Genitourinary: Negative.    Musculoskeletal:  Positive for arthralgias, back pain (lower right side/leg/foot) and myalgias.   Skin: Negative.    Allergic/Immunologic: Negative.    Neurological:  Positive for numbness (tingling/right leg /foot).   Hematological: Negative.    Psychiatric/Behavioral:  Positive for  "sleep disturbance.    All other systems reviewed and are negative.      Objective     ./94 (BP Location: Left arm, Patient Position: Sitting)   Pulse 56   Ht 165.1 cm (65\")   Wt 62.1 kg (137 lb)   LMP  (LMP Unknown)   SpO2 95%   BMI 22.80 kg/m²    Body mass index is 22.8 kg/m².    Vitals:    07/08/25 1140   PainSc: 7           Physical Exam  Neurological Exam  Lower extremity motor strength 5/5      Assessment & Plan   Independent Review of Radiographic Studies:      I personally reviewed the images from the following studies.    MRI lumbar spine 6/3/2025    Postsurgical changes noted with laminectomy and fusion from L2-L5 with pedicle screws and interbody device at L4-L5.  S-shaped thoracolumbar scoliosis.  Mild retrolisthesis of L1 on L2 with moderate left foraminal stenosis no high-grade canal stenosis.  Increased right-sided foraminal and subarticular stenosis L5-S1 secondary to synovial cyst      Medical Decision Making:      Adelina Santoyo a 70 y.o. female with extension of lumbar fusion completed in October 2024 with surgeon in [place] being seen today for follow-up for several month history of right leg pain and back pain with radiographic evidence of lateral recess stenosis at L5-S1 complicated with synovial cyst.  Patient's clinical presentation with her leg symptoms concordant with imaging.  I will send her for targeted injection see how she responds with her leg and obtain CT imaging of her lumbar spine.  She is wanting to follow-up with Dr. Rae afterward and I did tell her that it is up to his discretion any further treatment options as there has been no follow-up from her prior surgery in regards to any possible complications.  Patient is agreeable plan of care and wishes to proceed.  She was encouraged to call if questions or concerns.        Diagnoses and all orders for this visit:    1. Synovial cyst (Primary)  -     Epidural Block; Future  -     CT Lumbar Spine Without Contrast; " Future    2. Lumbar radiculopathy  -     Epidural Block; Future  -     CT Lumbar Spine Without Contrast; Future    3. S/P lumbar fusion      Return if symptoms worsen or fail to improve, for f/u with Dr. Rae.    This patient was examined wearing appropriate personal protective equipment.     Adelina Xavier  reports that she has never smoked. She has never been exposed to tobacco smoke. She has never used smokeless tobacco.   Body mass index is 22.8 kg/m².    BMI is within normal parameters. No other follow-up for BMI required.    Patient's blood pressure was reviewed.  Recommendations for  a low-salt diet and exercise to maintain/improve BP in addition to taking any presribed medications.    Advance Care Planning   ACP discussion was held with the patient during this visit. Patient has an advance directive (not in EMR), copy requested.         Sonya Omalley DNP, APRN  07/08/25  12:25 EDT

## 2025-07-08 ENCOUNTER — ANCILLARY ORDERS (OUTPATIENT)
Dept: NEUROSURGERY | Facility: CLINIC | Age: 70
End: 2025-07-08

## 2025-07-08 ENCOUNTER — OFFICE VISIT (OUTPATIENT)
Dept: NEUROSURGERY | Facility: CLINIC | Age: 70
End: 2025-07-08
Payer: MEDICARE

## 2025-07-08 VITALS
HEIGHT: 65 IN | BODY MASS INDEX: 22.82 KG/M2 | DIASTOLIC BLOOD PRESSURE: 94 MMHG | OXYGEN SATURATION: 95 % | SYSTOLIC BLOOD PRESSURE: 165 MMHG | WEIGHT: 137 LBS | HEART RATE: 56 BPM

## 2025-07-08 DIAGNOSIS — M71.30 SYNOVIAL CYST: Primary | ICD-10-CM

## 2025-07-08 DIAGNOSIS — M54.16 LUMBAR RADICULOPATHY: ICD-10-CM

## 2025-07-08 DIAGNOSIS — Z98.1 S/P LUMBAR FUSION: ICD-10-CM

## 2025-07-08 PROCEDURE — 1160F RVW MEDS BY RX/DR IN RCRD: CPT | Performed by: NURSE PRACTITIONER

## 2025-07-08 PROCEDURE — 99214 OFFICE O/P EST MOD 30 MIN: CPT | Performed by: NURSE PRACTITIONER

## 2025-07-08 PROCEDURE — 3080F DIAST BP >= 90 MM HG: CPT | Performed by: NURSE PRACTITIONER

## 2025-07-08 PROCEDURE — 1159F MED LIST DOCD IN RCRD: CPT | Performed by: NURSE PRACTITIONER

## 2025-07-08 PROCEDURE — 3077F SYST BP >= 140 MM HG: CPT | Performed by: NURSE PRACTITIONER

## 2025-07-08 RX ORDER — OXYCODONE AND ACETAMINOPHEN 10; 325 MG/1; MG/1
TABLET ORAL
COMMUNITY
Start: 2025-05-19

## 2025-07-08 RX ORDER — GABAPENTIN 300 MG/1
300 CAPSULE ORAL 2 TIMES DAILY
COMMUNITY

## 2025-07-23 ENCOUNTER — HOSPITAL ENCOUNTER (OUTPATIENT)
Dept: GENERAL RADIOLOGY | Facility: HOSPITAL | Age: 70
Discharge: HOME | End: 2025-07-23
Payer: MEDICARE

## 2025-07-23 ENCOUNTER — HOSPITAL ENCOUNTER (OUTPATIENT)
Dept: PAIN MEDICINE | Facility: HOSPITAL | Age: 70
Discharge: HOME | End: 2025-07-23
Payer: MEDICARE

## 2025-07-23 VITALS
OXYGEN SATURATION: 98 % | RESPIRATION RATE: 16 BRPM | TEMPERATURE: 98.1 F | SYSTOLIC BLOOD PRESSURE: 148 MMHG | DIASTOLIC BLOOD PRESSURE: 82 MMHG | HEART RATE: 56 BPM

## 2025-07-23 DIAGNOSIS — G89.29 CHRONIC MIDLINE LOW BACK PAIN WITH RIGHT-SIDED SCIATICA: Primary | ICD-10-CM

## 2025-07-23 DIAGNOSIS — M54.41 CHRONIC MIDLINE LOW BACK PAIN WITH RIGHT-SIDED SCIATICA: Primary | ICD-10-CM

## 2025-07-23 DIAGNOSIS — M54.16 LUMBAR RADICULOPATHY: ICD-10-CM

## 2025-07-23 DIAGNOSIS — R52 PAIN: ICD-10-CM

## 2025-07-23 DIAGNOSIS — M71.30 SYNOVIAL CYST: ICD-10-CM

## 2025-07-23 PROCEDURE — 77003 FLUOROGUIDE FOR SPINE INJECT: CPT

## 2025-07-23 PROCEDURE — 25510000001 IOPAMIDOL 41 % SOLUTION: Performed by: PHYSICAL MEDICINE & REHABILITATION

## 2025-07-23 PROCEDURE — 25010000002 DEXAMETHASONE SODIUM PHOSPHATE 10 MG/ML SOLUTION: Performed by: PHYSICAL MEDICINE & REHABILITATION

## 2025-07-23 RX ORDER — OXYCODONE HYDROCHLORIDE 15 MG/1
1 TABLET ORAL
COMMUNITY
Start: 2025-07-10

## 2025-07-23 RX ORDER — IOPAMIDOL 408 MG/ML
10 INJECTION, SOLUTION INTRATHECAL
Status: COMPLETED | OUTPATIENT
Start: 2025-07-23 | End: 2025-07-23

## 2025-07-23 RX ORDER — DEXAMETHASONE SODIUM PHOSPHATE 10 MG/ML
10 INJECTION, SOLUTION INTRAMUSCULAR; INTRAVENOUS ONCE
Status: COMPLETED | OUTPATIENT
Start: 2025-07-23 | End: 2025-07-23

## 2025-07-23 RX ADMIN — IOPAMIDOL 10 ML: 408 INJECTION, SOLUTION INTRATHECAL at 10:40

## 2025-07-23 RX ADMIN — DEXAMETHASONE SODIUM PHOSPHATE 10 MG: 10 INJECTION, SOLUTION INTRAMUSCULAR; INTRAVENOUS at 10:40

## 2025-07-23 NOTE — PROCEDURES
Procedures    Here for LESI, referred by neurosurgery for L5/S1 LELAND, MRI L-spine with right L5/S1 neuroforaminal stenosis with synovial cyst, has h/o lumbar surgeries. Will perform as right L5 TFESI given increased risk of complications with prior surgeries with ILESI approach, higher likelihood of improved radicular symptoms with neuroforaminal stenosis. Denies allergy to iodine or contrast, anticoagulation, current infection or ABX.       Lumbar Transforaminal Epidural Steroid Injection    PREOPERATIVE DIAGNOSIS: Lumbar radiculopathy    POSTOPERATIVE DIAGNOSIS: Lumbar radiculopathy    PROCEDURE PERFORMED: Transforaminal Epidural, right L5    The patient presents with a history of  lumbar degenerative disc disease with stenosis. The patient presents today for a [ transforaminal epidural ] at right L5.  This is the [ first ] procedure. The patient understands the risks and benefits of the procedure and wishes to proceed. The patient was seen in the preoperative area.  Patient's consent was obtained and updated.  Vitals were taken.  Patient was then brought to the procedure suite and placed in a prone position. The appropriate anatomic area was widely prepped with Chloroprep and draped in a sterile fashion.  Under fluoroscopic guidance using oblique view, a 25 guage curved tip spinal needle  was passed through skin anesthetized with 1% Lidocaine without epinephrine. The needle tip was advanced to the inferior medial aspect of the transverse process and carefully walked into the neuroforamin.  At no time were parathesias elicited. Preservative free contrast 1 ml was injected under live fluoro to verify epidural placement. At this point [ 3 ] mL of a solution containing [ Dexamethasone 10mg and Lidocaine PF 1% 2ml ] were injected. The patient reported no discomfort with injection. A sterile dressing was placed over the puncture sites.    The patient tolerated the procedure with [ no complications ]. They were then  brought to the post procedure area where they recovered nicely.    Discharge:  The patient will be discharged home in stable condition.   Patient understands to contact the Center with any post procedure questions or concerns.  Discharge instructions given by nursing staff.

## 2025-08-25 ENCOUNTER — OFFICE VISIT (OUTPATIENT)
Dept: NEUROSURGERY | Facility: CLINIC | Age: 70
End: 2025-08-25
Payer: MEDICARE

## 2025-08-25 ENCOUNTER — HOSPITAL ENCOUNTER (OUTPATIENT)
Dept: GENERAL RADIOLOGY | Facility: HOSPITAL | Age: 70
Discharge: HOME | End: 2025-08-25
Payer: MEDICARE

## 2025-08-25 VITALS
RESPIRATION RATE: 18 BRPM | WEIGHT: 137 LBS | HEART RATE: 70 BPM | HEIGHT: 65 IN | BODY MASS INDEX: 22.82 KG/M2 | OXYGEN SATURATION: 97 %

## 2025-08-25 DIAGNOSIS — M54.16 LUMBAR RADICULOPATHY: ICD-10-CM

## 2025-08-25 DIAGNOSIS — Z98.1 S/P LUMBAR FUSION: Primary | ICD-10-CM

## 2025-08-25 DIAGNOSIS — M71.30 SYNOVIAL CYST: ICD-10-CM

## 2025-08-25 PROCEDURE — 99214 OFFICE O/P EST MOD 30 MIN: CPT | Performed by: NEUROLOGICAL SURGERY

## 2025-08-25 PROCEDURE — 72120 X-RAY BEND ONLY L-S SPINE: CPT

## 2025-08-25 PROCEDURE — 1159F MED LIST DOCD IN RCRD: CPT | Performed by: NEUROLOGICAL SURGERY

## 2025-08-25 PROCEDURE — 72082 X-RAY EXAM ENTIRE SPI 2/3 VW: CPT

## 2025-08-25 PROCEDURE — 1160F RVW MEDS BY RX/DR IN RCRD: CPT | Performed by: NEUROLOGICAL SURGERY
